# Patient Record
Sex: FEMALE | Race: OTHER | NOT HISPANIC OR LATINO
[De-identification: names, ages, dates, MRNs, and addresses within clinical notes are randomized per-mention and may not be internally consistent; named-entity substitution may affect disease eponyms.]

---

## 2024-03-10 ENCOUNTER — NON-APPOINTMENT (OUTPATIENT)
Age: 74
End: 2024-03-10

## 2024-03-11 ENCOUNTER — EMERGENCY (EMERGENCY)
Facility: HOSPITAL | Age: 74
LOS: 0 days | Discharge: HOME | End: 2024-03-11
Attending: EMERGENCY MEDICINE
Payer: MEDICARE

## 2024-03-11 VITALS
TEMPERATURE: 98 F | RESPIRATION RATE: 18 BRPM | SYSTOLIC BLOOD PRESSURE: 135 MMHG | OXYGEN SATURATION: 99 % | DIASTOLIC BLOOD PRESSURE: 71 MMHG | HEART RATE: 76 BPM

## 2024-03-11 VITALS
TEMPERATURE: 98 F | WEIGHT: 104.94 LBS | HEART RATE: 90 BPM | DIASTOLIC BLOOD PRESSURE: 63 MMHG | SYSTOLIC BLOOD PRESSURE: 117 MMHG | RESPIRATION RATE: 18 BRPM | OXYGEN SATURATION: 98 %

## 2024-03-11 DIAGNOSIS — F41.9 ANXIETY DISORDER, UNSPECIFIED: ICD-10-CM

## 2024-03-11 DIAGNOSIS — R42 DIZZINESS AND GIDDINESS: ICD-10-CM

## 2024-03-11 DIAGNOSIS — R53.1 WEAKNESS: ICD-10-CM

## 2024-03-11 DIAGNOSIS — F32.A DEPRESSION, UNSPECIFIED: ICD-10-CM

## 2024-03-11 LAB
ALBUMIN SERPL ELPH-MCNC: 4.4 G/DL — SIGNIFICANT CHANGE UP (ref 3.5–5.2)
ALP SERPL-CCNC: 72 U/L — SIGNIFICANT CHANGE UP (ref 30–115)
ALT FLD-CCNC: 10 U/L — SIGNIFICANT CHANGE UP (ref 0–41)
ANION GAP SERPL CALC-SCNC: 7 MMOL/L — SIGNIFICANT CHANGE UP (ref 7–14)
AST SERPL-CCNC: 19 U/L — SIGNIFICANT CHANGE UP (ref 0–41)
BASOPHILS # BLD AUTO: 0.02 K/UL — SIGNIFICANT CHANGE UP (ref 0–0.2)
BASOPHILS NFR BLD AUTO: 0.4 % — SIGNIFICANT CHANGE UP (ref 0–1)
BILIRUB SERPL-MCNC: 0.3 MG/DL — SIGNIFICANT CHANGE UP (ref 0.2–1.2)
BUN SERPL-MCNC: 13 MG/DL — SIGNIFICANT CHANGE UP (ref 10–20)
CALCIUM SERPL-MCNC: 9.5 MG/DL — SIGNIFICANT CHANGE UP (ref 8.4–10.5)
CHLORIDE SERPL-SCNC: 106 MMOL/L — SIGNIFICANT CHANGE UP (ref 98–110)
CO2 SERPL-SCNC: 29 MMOL/L — SIGNIFICANT CHANGE UP (ref 17–32)
CREAT SERPL-MCNC: 0.7 MG/DL — SIGNIFICANT CHANGE UP (ref 0.7–1.5)
EGFR: 91 ML/MIN/1.73M2 — SIGNIFICANT CHANGE UP
EOSINOPHIL # BLD AUTO: 0 K/UL — SIGNIFICANT CHANGE UP (ref 0–0.7)
EOSINOPHIL NFR BLD AUTO: 0 % — SIGNIFICANT CHANGE UP (ref 0–8)
GLUCOSE SERPL-MCNC: 93 MG/DL — SIGNIFICANT CHANGE UP (ref 70–99)
HCT VFR BLD CALC: 39.8 % — SIGNIFICANT CHANGE UP (ref 37–47)
HGB BLD-MCNC: 13.3 G/DL — SIGNIFICANT CHANGE UP (ref 12–16)
IMM GRANULOCYTES NFR BLD AUTO: 0.2 % — SIGNIFICANT CHANGE UP (ref 0.1–0.3)
LYMPHOCYTES # BLD AUTO: 0.88 K/UL — LOW (ref 1.2–3.4)
LYMPHOCYTES # BLD AUTO: 16.5 % — LOW (ref 20.5–51.1)
MCHC RBC-ENTMCNC: 29.1 PG — SIGNIFICANT CHANGE UP (ref 27–31)
MCHC RBC-ENTMCNC: 33.4 G/DL — SIGNIFICANT CHANGE UP (ref 32–37)
MCV RBC AUTO: 87.1 FL — SIGNIFICANT CHANGE UP (ref 81–99)
MONOCYTES # BLD AUTO: 0.28 K/UL — SIGNIFICANT CHANGE UP (ref 0.1–0.6)
MONOCYTES NFR BLD AUTO: 5.3 % — SIGNIFICANT CHANGE UP (ref 1.7–9.3)
NEUTROPHILS # BLD AUTO: 4.14 K/UL — SIGNIFICANT CHANGE UP (ref 1.4–6.5)
NEUTROPHILS NFR BLD AUTO: 77.6 % — HIGH (ref 42.2–75.2)
NRBC # BLD: 0 /100 WBCS — SIGNIFICANT CHANGE UP (ref 0–0)
PLATELET # BLD AUTO: 201 K/UL — SIGNIFICANT CHANGE UP (ref 130–400)
PMV BLD: 9.7 FL — SIGNIFICANT CHANGE UP (ref 7.4–10.4)
POTASSIUM SERPL-MCNC: 4.6 MMOL/L — SIGNIFICANT CHANGE UP (ref 3.5–5)
POTASSIUM SERPL-SCNC: 4.6 MMOL/L — SIGNIFICANT CHANGE UP (ref 3.5–5)
PROT SERPL-MCNC: 6.4 G/DL — SIGNIFICANT CHANGE UP (ref 6–8)
RBC # BLD: 4.57 M/UL — SIGNIFICANT CHANGE UP (ref 4.2–5.4)
RBC # FLD: 13.8 % — SIGNIFICANT CHANGE UP (ref 11.5–14.5)
SODIUM SERPL-SCNC: 142 MMOL/L — SIGNIFICANT CHANGE UP (ref 135–146)
WBC # BLD: 5.33 K/UL — SIGNIFICANT CHANGE UP (ref 4.8–10.8)
WBC # FLD AUTO: 5.33 K/UL — SIGNIFICANT CHANGE UP (ref 4.8–10.8)

## 2024-03-11 PROCEDURE — 85025 COMPLETE CBC W/AUTO DIFF WBC: CPT

## 2024-03-11 PROCEDURE — 99284 EMERGENCY DEPT VISIT MOD MDM: CPT | Mod: FS

## 2024-03-11 PROCEDURE — 70450 CT HEAD/BRAIN W/O DYE: CPT | Mod: MC

## 2024-03-11 PROCEDURE — 80053 COMPREHEN METABOLIC PANEL: CPT

## 2024-03-11 PROCEDURE — 70450 CT HEAD/BRAIN W/O DYE: CPT | Mod: 26,MC

## 2024-03-11 PROCEDURE — 36415 COLL VENOUS BLD VENIPUNCTURE: CPT

## 2024-03-11 PROCEDURE — 99284 EMERGENCY DEPT VISIT MOD MDM: CPT | Mod: 25

## 2024-03-11 NOTE — ED PROVIDER NOTE - CARE PROVIDER_API CALL
Randell Devine  Otolaryngology  [address] [place] [id number]  Phone: [phone]  Fax: [phone]  Follow Up Time:

## 2024-03-11 NOTE — ED ADULT NURSE NOTE - OBJECTIVE STATEMENT
pt came in c/o dizziness and weakness for many months. currently does not have a PCP and looking for one.

## 2024-03-11 NOTE — ED PROVIDER NOTE - NSFOLLOWUPINSTRUCTIONS_ED_ALL_ED_FT
Follow up with PMD and ENT in 1-2 days.    Dizziness  Dizziness is a common problem. It is a feeling of unsteadiness or light-headedness. You may feel like you are about to faint. Dizziness can lead to injury if you stumble or fall. Anyone can become dizzy, but dizziness is more common in older adults. This condition can be caused by a number of things, including medicines, dehydration, or illness.    Follow these instructions at home:  Eating and drinking     Drink enough fluid to keep your urine clear or pale yellow. This helps to keep you from becoming dehydrated. Try to drink more clear fluids, such as water.  Do not drink alcohol.  Limit your caffeine intake if told to do so by your health care provider. Check ingredients and nutrition facts to see if a food or beverage contains caffeine.  Limit your salt (sodium) intake if told to do so by your health care provider. Check ingredients and nutrition facts to see if a food or beverage contains sodium.  Activity     Avoid making quick movements.  Rise slowly from chairs and steady yourself until you feel okay.  In the morning, first sit up on the side of the bed. When you feel okay, stand slowly while you hold onto something until you know that your balance is fine.  If you need to  one place for a long time, move your legs often. Tighten and relax the muscles in your legs while you are standing.  Do not drive or use heavy machinery if you feel dizzy.  Avoid bending down if you feel dizzy. Place items in your home so that they are easy for you to reach without leaning over.  Lifestyle     Do not use any products that contain nicotine or tobacco, such as cigarettes and e-cigarettes. If you need help quitting, ask your health care provider.  Try to reduce your stress level by using methods such as yoga or meditation. Talk with your health care provider if you need help to manage your stress.  General instructions     Watch your dizziness for any changes.  Take over-the-counter and prescription medicines only as told by your health care provider. Talk with your health care provider if you think that your dizziness is caused by a medicine that you are taking.  Tell a friend or a family member that you are feeling dizzy. If he or she notices any changes in your behavior, have this person call your health care provider.  Keep all follow-up visits as told by your health care provider. This is important.  Contact a health care provider if:  Your dizziness does not go away.  Your dizziness or light-headedness gets worse.  You feel nauseous.  You have reduced hearing.  You have new symptoms.  You are unsteady on your feet or you feel like the room is spinning.  Get help right away if:  You vomit or have diarrhea and are unable to eat or drink anything.  You have problems talking, walking, swallowing, or using your arms, hands, or legs.  You feel generally weak.  You are not thinking clearly or you have trouble forming sentences. It may take a friend or family member to notice this.  You have chest pain, abdominal pain, shortness of breath, or sweating.  Your vision changes.  You have any bleeding.  You have a severe headache.  You have neck pain or a stiff neck.  You have a fever.  These symptoms may represent a serious problem that is an emergency. Do not wait to see if the symptoms will go away. Get medical help right away. Call your local emergency services (911 in the [place] Do not drive yourself to the hospital.     Summary  Dizziness is a feeling of unsteadiness or light-headedness. This condition can be caused by a number of things, including medicines, dehydration, or illness.  Anyone can become dizzy, but dizziness is more common in older adults.  Drink enough fluid to keep your urine clear or pale yellow. Do not drink alcohol.  Avoid making quick movements if you feel dizzy. Monitor your dizziness for any changes.  This information is not intended to replace advice given to you by your health care provider. Make sure you discuss any questions you have with your health care provider.

## 2024-03-11 NOTE — ED PROVIDER NOTE - CLINICAL SUMMARY MEDICAL DECISION MAKING FREE TEXT BOX
This patient presents with dizziness, most consistent with a peripheral cause. No history of recent infection so doubt vestibular neuritis. History not consistent with meniere's disease. No history of trauma. No red flag features for central vertigo to include gradual onset, vertical/bidirectional or non-fatigable nystagmus, focal neurologic findings on exam (including inability to ambulate, ataxia, dysmetria). Presentation not consistent with an acute CNS infection, vertebral basilar artery insufficiency, cerebellar hemorrhage or infarction, intracranial mass or bleed. Character low suspicion for CVA and no focal or localizing findings. No significant arrhythmia or evidence of aortic outflow obstruction. No anemia or bleeding or gross electrolyte abnormalities. No CP/SOB to suggest ACS or evidence of DVT to suggest PE. No fever or specific infectious symptoms.    No indication for admission or further emergent evaluation at this time. Was explained that the source of the patient's symptoms is UNCLEAR, and that the patient should still follow up with their primary physician or neurology  for further evaluation and management. These elements were discussed with the PATIENT and daughter and they are amenable to outpatient follow-up at this time.    They were given detailed return precautions and advised to return to the emergency department in 2-3 days if not improving or sooner if any new symptoms develop, symptoms worsened or for any concerns. They were offered the opportunity to ask questions and verbalized that they understand the diagnosis and discharge instructions.

## 2024-03-11 NOTE — ED PROVIDER NOTE - PATIENT PORTAL LINK FT
You can access the FollowMyHealth Patient Portal offered by [facility] by registering at the following website: [email or web address]. By joining [employer]’s FollowMyHealth portal, you will also be able to view your health information using other applications (apps) compatible with our system.

## 2024-03-11 NOTE — ED PROVIDER NOTE - ATTENDING APP SHARED VISIT CONTRIBUTION OF CARE
I have reviewed and agree with the mid-level note, except as documented in my note below.    73-year-old female history of depression, anxiety, denies significant PSH, non-smoker, denies daily EtOH use, presents with dizziness and generalized weakness over the past several months, persistent, denies modifying factors, also reports bilateral lower extremity weakness (denies numbness currently even though noted at triage), denies fever, visual disturbances, headache, palpitations, irregular heart-beat, chest pain / pressure, abd pain, n/v/d, urinary sx, dyspnea, LE edema, near or total loss of consciousness, abnormal speech, paresthesias, clumsiness, difficulty with coordination, focal weakness or neurological deficits, postural changes, gait disturbances, new medication changes, change in caffeine, nicotine or alcohol intake, recent trauma or other associated complaints at present. No old chart available for review. I have reviewed and agree with the initial nursing note, except as documented in my note.     VSS, awake, alert, non-toxic appearing, PERRL / EOMI,  no nystagmus, external ears are normal, auditory meatus is clear and non-erythematous bilaterally, BVL cerumen impaction, oropharynx clear, mmm, no JVD or carotid bruit, no skin rash or lesions, chest CTAB, non-labored breathing, no w/r/r, +S1/S2, RRR, no m/r/g, abdomen soft, NT, ND, +BS, no peripheral edema or deformities, equal pulses upper and lower extremities, alert and oriented to person, place and time, clear speech, cranial nerves II-XII are intact, upper and lower extremity strength is 5/5, symmetrical against gravity and external force, cerebellar testing of finger to nose is intact, coordination and gait are normal.

## 2024-03-11 NOTE — ED PROVIDER NOTE - PHYSICAL EXAMINATION
CONST: Well appearing in NAD  EYES: PERRL, EOMI, Sclera and conjunctiva clear.   ENT: No nasal discharge. Oropharynx normal appearing.   NECK: Non-tender, no meningeal signs. normal ROM. supple   CARD: S1 S2; No jvd  RESP: Equal BS B/L, No wheezes, rhonchi or rales. No distress  GI: Soft, non-tender, non-distended. normal BS  MS: Normal ROM in all extremities. pulses 2 +. no calf tenderness or swelling  SKIN: Warm, dry, no acute rashes. Good turgor  NEURO: A&Ox3, No focal deficits. Strength 5/5 with no sensory deficits. Finger to nose intact. Negative pronator drift CONST: Well appearing in NAD  EYES: PERRL, EOMI, Sclera and conjunctiva clear.   ENT: B/L cerumen impaction. No nasal discharge. Oropharynx normal appearing.   NECK: Non-tender, no meningeal signs. normal ROM. supple   CARD: S1 S2; No jvd  RESP: Equal BS B/L, No wheezes, rhonchi or rales. No distress  GI: Soft, non-tender, non-distended. normal BS  MS: Normal ROM in all extremities. pulses 2 +. no calf tenderness or swelling  SKIN: Warm, dry, no acute rashes. Good turgor  NEURO: A&Ox3, No focal deficits. Strength 5/5 with no sensory deficits. Finger to nose intact. Negative pronator drift

## 2024-03-11 NOTE — ED PROVIDER NOTE - OBJECTIVE STATEMENT
73-year-old female past medical history of anxiety, depression presents for evaluation.  Patient endorses generalized weakness for the past couple months with associated lightheadedness, aggravated while at dance, no relieving factors.  Denies fever, headache, chest pain, shortness of breath, weakness, dizziness, hematuria.

## 2024-04-09 PROBLEM — Z00.00 ENCOUNTER FOR PREVENTIVE HEALTH EXAMINATION: Status: ACTIVE | Noted: 2024-04-09

## 2024-04-15 ENCOUNTER — APPOINTMENT (OUTPATIENT)
Dept: NEUROLOGY | Facility: CLINIC | Age: 74
End: 2024-04-15

## 2024-04-18 ENCOUNTER — APPOINTMENT (OUTPATIENT)
Dept: NEUROLOGY | Facility: CLINIC | Age: 74
End: 2024-04-18
Payer: MEDICARE

## 2024-04-18 VITALS
WEIGHT: 98 LBS | HEART RATE: 77 BPM | DIASTOLIC BLOOD PRESSURE: 82 MMHG | SYSTOLIC BLOOD PRESSURE: 141 MMHG | BODY MASS INDEX: 15.75 KG/M2 | HEIGHT: 66 IN

## 2024-04-18 PROCEDURE — G2211 COMPLEX E/M VISIT ADD ON: CPT

## 2024-04-18 PROCEDURE — 99204 OFFICE O/P NEW MOD 45 MIN: CPT

## 2024-04-18 NOTE — ASSESSMENT
[FreeTextEntry1] : Unsteady gait-etiology unclear, may involve both central or peripheral causes.  Even though she had a CT of the head already, however CT does not image the posterior fossa well, therefore she needs an MRI.  She also has symptoms and signs of neuropathy that need to be evaluated.    - MRI of the brain without gadolinium - EMG/NCS of the lower extremities - Trial of physical therapy for fall prevention and gait training  Cervicalgia - Trial of physical therapy  Total clinician time spent  is  46 minutes including preparing to see the patient, obtaining and/or reviewing and confirming history, performing a medically necessary and appropriate examination, counseling and educating the patient and/or family, documenting clinical information in the HER and communicating and/or referring to other healthcare professionals.

## 2024-04-18 NOTE — PHYSICAL EXAM
[Person] : oriented to person [Place] : oriented to place [Time] : oriented to time [Concentration Intact] : normal concentrating ability [Visual Intact] : visual attention was ~T not ~L decreased [Naming Objects] : no difficulty naming common objects [Repeating Phrases] : no difficulty repeating a phrase [Writing A Sentence] : no difficulty writing a sentence [Fluency] : fluency intact [Comprehension] : comprehension intact [Reading] : reading intact [Past History] : adequate knowledge of personal past history [Cranial Nerves Optic (II)] : visual acuity intact bilaterally,  visual fields full to confrontation, pupils equal round and reactive to light [Cranial Nerves Oculomotor (III)] : extraocular motion intact [Cranial Nerves Trigeminal (V)] : facial sensation intact symmetrically [Cranial Nerves Facial (VII)] : face symmetrical [Cranial Nerves Vestibulocochlear (VIII)] : hearing was intact bilaterally [Cranial Nerves Glossopharyngeal (IX)] : tongue and palate midline [Cranial Nerves Accessory (XI - Cranial And Spinal)] : head turning and shoulder shrug symmetric [Cranial Nerves Hypoglossal (XII)] : there was no tongue deviation with protrusion [Motor Tone] : muscle tone was normal in all four extremities [Motor Strength] : muscle strength was normal in all four extremities [No Muscle Atrophy] : normal bulk in all four extremities [Past-pointing] : there was no past-pointing [Tremor] : no tremor present [1+] : Ankle jerk left 1+ [Plantar Reflex Right Only] : normal on the right [Plantar Reflex Left Only] : normal on the left [FreeTextEntry6] : Very limited range of motion in the neck, spasm of bilateral trapezii as well as cervical paraspinals [FreeTextEntry7] : Decreased vibration distally [FreeTextEntry8] : Positive Romberg, slight trouble with tandem

## 2024-04-19 ENCOUNTER — TRANSCRIPTION ENCOUNTER (OUTPATIENT)
Age: 74
End: 2024-04-19

## 2024-05-03 ENCOUNTER — APPOINTMENT (OUTPATIENT)
Dept: NEUROLOGY | Facility: CLINIC | Age: 74
End: 2024-05-03
Payer: MEDICARE

## 2024-05-03 PROCEDURE — 95912 NRV CNDJ TEST 11-12 STUDIES: CPT

## 2024-05-03 PROCEDURE — 95886 MUSC TEST DONE W/N TEST COMP: CPT

## 2024-05-17 ENCOUNTER — APPOINTMENT (OUTPATIENT)
Dept: NEUROLOGY | Facility: CLINIC | Age: 74
End: 2024-05-17
Payer: MEDICARE

## 2024-05-17 VITALS — HEART RATE: 89 BPM | SYSTOLIC BLOOD PRESSURE: 111 MMHG | DIASTOLIC BLOOD PRESSURE: 74 MMHG

## 2024-05-17 VITALS — BODY MASS INDEX: 15.75 KG/M2 | WEIGHT: 98 LBS | HEIGHT: 66 IN

## 2024-05-17 DIAGNOSIS — M54.2 CERVICALGIA: ICD-10-CM

## 2024-05-17 DIAGNOSIS — Z86.69 PERSONAL HISTORY OF OTHER DISEASES OF THE NERVOUS SYSTEM AND SENSE ORGANS: ICD-10-CM

## 2024-05-17 DIAGNOSIS — R26.81 UNSTEADINESS ON FEET: ICD-10-CM

## 2024-05-17 PROCEDURE — 99213 OFFICE O/P EST LOW 20 MIN: CPT

## 2024-05-17 NOTE — HISTORY OF PRESENT ILLNESS
[FreeTextEntry1] : Ms. NELSON EUBANKS returns to the office for follow-up and her prior history and physical have been reviewed and she reports no change since last visit.  She was last seen for the evaluation of unsteady gait.  She was sent for MRI of the brain as well as EMG/NCS of the lower extremities to rule out both central as well as peripheral causes.  MRI of the brain did not show any significant intracranial pathology, and there was no evidence of peripheral neuropathy on the EMG/NCS of the lower extremities.  Patient improved clinically after stopping taking Abilify as well as starting physical therapy.  She is still on few other psych medication that she may want to get off as well.    She also has neck pain but has not done therapy for that yet

## 2024-05-17 NOTE — ASSESSMENT
[FreeTextEntry1] : Unsteady gait-much improved after stopping Abilify.  Possibly medication side effect. -Went over MRI of the brain as well as EMG/NCS of the lower extremities results.  Patient was relieved that her MRI of the brain did not have any significant pathology - Continue physical therapy if needed  Cervicalgia - A trial of physical therapy - Can return on as-needed basis

## 2025-03-08 ENCOUNTER — INPATIENT (INPATIENT)
Facility: HOSPITAL | Age: 75
LOS: 2 days | Discharge: HOME HEALTH | DRG: 964 | End: 2025-03-11
Attending: SURGERY | Admitting: SURGERY
Payer: MEDICARE

## 2025-03-08 VITALS
DIASTOLIC BLOOD PRESSURE: 74 MMHG | RESPIRATION RATE: 18 BRPM | WEIGHT: 149.91 LBS | SYSTOLIC BLOOD PRESSURE: 115 MMHG | TEMPERATURE: 98 F | OXYGEN SATURATION: 95 % | HEART RATE: 74 BPM
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
## 2025-03-08 LAB
ALBUMIN SERPL ELPH-MCNC: 4.8 G/DL — SIGNIFICANT CHANGE UP (ref 3.5–5.2)
ALP SERPL-CCNC: 98 U/L — SIGNIFICANT CHANGE UP (ref 30–115)
ALT FLD-CCNC: 35 U/L — SIGNIFICANT CHANGE UP (ref 0–41)
ANION GAP SERPL CALC-SCNC: 15 MMOL/L — HIGH (ref 7–14)
APTT BLD: 31.7 SEC — SIGNIFICANT CHANGE UP (ref 27–39.2)
AST SERPL-CCNC: 48 U/L — HIGH (ref 0–41)
BASOPHILS # BLD AUTO: 0.09 K/UL — SIGNIFICANT CHANGE UP (ref 0–0.2)
BASOPHILS NFR BLD AUTO: 0.8 % — SIGNIFICANT CHANGE UP (ref 0–1)
BILIRUB SERPL-MCNC: 0.6 MG/DL — SIGNIFICANT CHANGE UP (ref 0.2–1.2)
BUN SERPL-MCNC: 10 MG/DL — SIGNIFICANT CHANGE UP (ref 10–20)
CALCIUM SERPL-MCNC: 9.1 MG/DL — SIGNIFICANT CHANGE UP (ref 8.4–10.5)
CHLORIDE SERPL-SCNC: 96 MMOL/L — LOW (ref 98–110)
CK SERPL-CCNC: 270 U/L — HIGH (ref 0–225)
CO2 SERPL-SCNC: 22 MMOL/L — SIGNIFICANT CHANGE UP (ref 17–32)
CREAT SERPL-MCNC: 0.6 MG/DL — LOW (ref 0.7–1.5)
EGFR: 94 ML/MIN/1.73M2 — SIGNIFICANT CHANGE UP
EGFR: 94 ML/MIN/1.73M2 — SIGNIFICANT CHANGE UP
EOSINOPHIL # BLD AUTO: 0 K/UL — SIGNIFICANT CHANGE UP (ref 0–0.7)
EOSINOPHIL NFR BLD AUTO: 0 % — SIGNIFICANT CHANGE UP (ref 0–8)
GLUCOSE SERPL-MCNC: 65 MG/DL — LOW (ref 70–99)
HCT VFR BLD CALC: 43.5 % — SIGNIFICANT CHANGE UP (ref 37–47)
HGB BLD-MCNC: 14.4 G/DL — SIGNIFICANT CHANGE UP (ref 12–16)
INR BLD: 0.84 RATIO — SIGNIFICANT CHANGE UP (ref 0.65–1.3)
LACTATE SERPL-SCNC: 3.1 MMOL/L — HIGH (ref 0.7–2)
LIDOCAIN IGE QN: 48 U/L — SIGNIFICANT CHANGE UP (ref 7–60)
LYMPHOCYTES # BLD AUTO: 0.11 K/UL — LOW (ref 1.2–3.4)
LYMPHOCYTES # BLD AUTO: 0.9 % — LOW (ref 20.5–51.1)
MCHC RBC-ENTMCNC: 29.6 PG — SIGNIFICANT CHANGE UP (ref 27–31)
MCHC RBC-ENTMCNC: 33.1 G/DL — SIGNIFICANT CHANGE UP (ref 32–37)
MCV RBC AUTO: 89.3 FL — SIGNIFICANT CHANGE UP (ref 81–99)
MONOCYTES # BLD AUTO: 0.11 K/UL — SIGNIFICANT CHANGE UP (ref 0.1–0.6)
MONOCYTES NFR BLD AUTO: 0.9 % — LOW (ref 1.7–9.3)
NEUTROPHILS # BLD AUTO: 11.09 K/UL — HIGH (ref 1.4–6.5)
NEUTROPHILS NFR BLD AUTO: 94.8 % — HIGH (ref 42.2–75.2)
PLATELET # BLD AUTO: 218 K/UL — SIGNIFICANT CHANGE UP (ref 130–400)
PMV BLD: 8.6 FL — SIGNIFICANT CHANGE UP (ref 7.4–10.4)
POTASSIUM SERPL-MCNC: 5.1 MMOL/L — HIGH (ref 3.5–5)
POTASSIUM SERPL-SCNC: 5.1 MMOL/L — HIGH (ref 3.5–5)
PROT SERPL-MCNC: 6.7 G/DL — SIGNIFICANT CHANGE UP (ref 6–8)
PROTHROM AB SERPL-ACNC: 9.9 SEC — LOW (ref 9.95–12.87)
RBC # BLD: 4.87 M/UL — SIGNIFICANT CHANGE UP (ref 4.2–5.4)
RBC # FLD: 14.6 % — HIGH (ref 11.5–14.5)
SODIUM SERPL-SCNC: 133 MMOL/L — LOW (ref 135–146)
TROPONIN T, HIGH SENSITIVITY RESULT: 10 NG/L — SIGNIFICANT CHANGE UP (ref 6–13)
WBC # BLD: 11.7 K/UL — HIGH (ref 4.8–10.8)
WBC # FLD AUTO: 11.7 K/UL — HIGH (ref 4.8–10.8)

## 2025-03-08 PROCEDURE — 81003 URINALYSIS AUTO W/O SCOPE: CPT

## 2025-03-08 PROCEDURE — 97161 PT EVAL LOW COMPLEX 20 MIN: CPT | Mod: GP

## 2025-03-08 PROCEDURE — 70450 CT HEAD/BRAIN W/O DYE: CPT | Mod: 26,76

## 2025-03-08 PROCEDURE — 83735 ASSAY OF MAGNESIUM: CPT

## 2025-03-08 PROCEDURE — 84100 ASSAY OF PHOSPHORUS: CPT

## 2025-03-08 PROCEDURE — 87641 MR-STAPH DNA AMP PROBE: CPT

## 2025-03-08 PROCEDURE — 93306 TTE W/DOPPLER COMPLETE: CPT

## 2025-03-08 PROCEDURE — 99284 EMERGENCY DEPT VISIT MOD MDM: CPT | Mod: GC

## 2025-03-08 PROCEDURE — 85027 COMPLETE CBC AUTOMATED: CPT

## 2025-03-08 PROCEDURE — 86901 BLOOD TYPING SEROLOGIC RH(D): CPT

## 2025-03-08 PROCEDURE — 73552 X-RAY EXAM OF FEMUR 2/>: CPT | Mod: 26,RT

## 2025-03-08 PROCEDURE — 93010 ELECTROCARDIOGRAM REPORT: CPT

## 2025-03-08 PROCEDURE — 80048 BASIC METABOLIC PNL TOTAL CA: CPT

## 2025-03-08 PROCEDURE — 74177 CT ABD & PELVIS W/CONTRAST: CPT | Mod: 26

## 2025-03-08 PROCEDURE — 72170 X-RAY EXAM OF PELVIS: CPT | Mod: 26

## 2025-03-08 PROCEDURE — 87640 STAPH A DNA AMP PROBE: CPT

## 2025-03-08 PROCEDURE — 99291 CRITICAL CARE FIRST HOUR: CPT

## 2025-03-08 PROCEDURE — 71045 X-RAY EXAM CHEST 1 VIEW: CPT | Mod: 26

## 2025-03-08 PROCEDURE — 86900 BLOOD TYPING SEROLOGIC ABO: CPT

## 2025-03-08 PROCEDURE — 72125 CT NECK SPINE W/O DYE: CPT | Mod: 26

## 2025-03-08 PROCEDURE — 85025 COMPLETE CBC W/AUTO DIFF WBC: CPT

## 2025-03-08 PROCEDURE — 82550 ASSAY OF CK (CPK): CPT

## 2025-03-08 PROCEDURE — 70450 CT HEAD/BRAIN W/O DYE: CPT | Mod: MC

## 2025-03-08 PROCEDURE — 36415 COLL VENOUS BLD VENIPUNCTURE: CPT

## 2025-03-08 PROCEDURE — 94010 BREATHING CAPACITY TEST: CPT

## 2025-03-08 PROCEDURE — 71260 CT THORAX DX C+: CPT | Mod: 26

## 2025-03-08 PROCEDURE — 86850 RBC ANTIBODY SCREEN: CPT

## 2025-03-08 PROCEDURE — 80076 HEPATIC FUNCTION PANEL: CPT

## 2025-03-08 PROCEDURE — 82962 GLUCOSE BLOOD TEST: CPT

## 2025-03-08 PROCEDURE — 83605 ASSAY OF LACTIC ACID: CPT

## 2025-03-08 RX ORDER — LEVETIRACETAM 10 MG/ML
2000 INJECTION, SOLUTION INTRAVENOUS ONCE
Refills: 0 | Status: COMPLETED | OUTPATIENT
Start: 2025-03-08 | End: 2025-03-08

## 2025-03-08 RX ORDER — SODIUM CHLORIDE 9 G/1000ML
1000 INJECTION, SOLUTION INTRAVENOUS
Refills: 0 | Status: DISCONTINUED | OUTPATIENT
Start: 2025-03-08 | End: 2025-03-08

## 2025-03-08 RX ORDER — QUETIAPINE FUMARATE 25 MG/1
1 TABLET ORAL
Refills: 0 | DISCHARGE

## 2025-03-08 RX ORDER — SENNA 187 MG
2 TABLET ORAL AT BEDTIME
Refills: 0 | Status: DISCONTINUED | OUTPATIENT
Start: 2025-03-08 | End: 2025-03-11

## 2025-03-08 RX ORDER — SODIUM CHLORIDE 9 G/1000ML
1000 INJECTION, SOLUTION INTRAVENOUS ONCE
Refills: 0 | Status: COMPLETED | OUTPATIENT
Start: 2025-03-08 | End: 2025-03-08

## 2025-03-08 RX ORDER — ESCITALOPRAM OXALATE 20 MG/1
1 TABLET ORAL
Refills: 0 | DISCHARGE

## 2025-03-08 RX ORDER — ACETAMINOPHEN 500 MG/5ML
650 LIQUID (ML) ORAL EVERY 6 HOURS
Refills: 0 | Status: DISCONTINUED | OUTPATIENT
Start: 2025-03-08 | End: 2025-03-11

## 2025-03-08 RX ORDER — ALPRAZOLAM 0.5 MG
0.5 TABLET, EXTENDED RELEASE 24 HR ORAL DAILY
Refills: 0 | Status: DISCONTINUED | OUTPATIENT
Start: 2025-03-08 | End: 2025-03-09

## 2025-03-08 RX ORDER — LIDOCAINE HYDROCHLORIDE 20 MG/ML
1 JELLY TOPICAL DAILY
Refills: 0 | Status: DISCONTINUED | OUTPATIENT
Start: 2025-03-08 | End: 2025-03-11

## 2025-03-08 RX ORDER — ESCITALOPRAM OXALATE 20 MG/1
20 TABLET ORAL DAILY
Refills: 0 | Status: DISCONTINUED | OUTPATIENT
Start: 2025-03-08 | End: 2025-03-11

## 2025-03-08 RX ORDER — MIRTAZAPINE 30 MG/1
7.5 TABLET, FILM COATED ORAL DAILY
Refills: 0 | Status: DISCONTINUED | OUTPATIENT
Start: 2025-03-08 | End: 2025-03-10

## 2025-03-08 RX ORDER — ALPRAZOLAM 0.5 MG
1 TABLET, EXTENDED RELEASE 24 HR ORAL
Refills: 0 | DISCHARGE

## 2025-03-08 RX ORDER — QUETIAPINE FUMARATE 25 MG/1
100 TABLET ORAL AT BEDTIME
Refills: 0 | Status: DISCONTINUED | OUTPATIENT
Start: 2025-03-08 | End: 2025-03-11

## 2025-03-08 RX ORDER — LEVETIRACETAM 10 MG/ML
500 INJECTION, SOLUTION INTRAVENOUS EVERY 12 HOURS
Refills: 0 | Status: DISCONTINUED | OUTPATIENT
Start: 2025-03-08 | End: 2025-03-11

## 2025-03-08 RX ORDER — MIRTAZAPINE 30 MG/1
2 TABLET, FILM COATED ORAL
Refills: 0 | DISCHARGE

## 2025-03-08 RX ADMIN — MIRTAZAPINE 7.5 MILLIGRAM(S): 30 TABLET, FILM COATED ORAL at 22:16

## 2025-03-08 RX ADMIN — LEVETIRACETAM 1000 MILLIGRAM(S): 10 INJECTION, SOLUTION INTRAVENOUS at 18:57

## 2025-03-08 RX ADMIN — Medication 0.5 MILLIGRAM(S): at 22:17

## 2025-03-08 RX ADMIN — SODIUM CHLORIDE 1000 MILLILITER(S): 9 INJECTION, SOLUTION INTRAVENOUS at 17:21

## 2025-03-08 RX ADMIN — QUETIAPINE FUMARATE 100 MILLIGRAM(S): 25 TABLET ORAL at 22:17

## 2025-03-08 RX ADMIN — Medication 100 MILLILITER(S): at 20:15

## 2025-03-08 NOTE — ED ADULT NURSE NOTE - NSFALLHARMRISKINTERV_ED_ALL_ED
Communicate risk of Fall with Harm to all staff, patient, and family/Provide visual cue: red socks, yellow wristband, yellow gown, etc/Reinforce activity limits and safety measures with patient and family/Use of alarms - bed, stretcher, chair and/or video monitoring/Bed in lowest position, wheels locked, appropriate side rails in place/Call bell, personal items and telephone in reach/Instruct patient to call for assistance before getting out of bed/chair/stretcher/Non-slip footwear applied when patient is off stretcher/[place] to call system/Physically safe environment - no spills, clutter or unnecessary equipment/Purposeful Proactive Rounding/Room/bathroom lighting operational, light cord in reach

## 2025-03-08 NOTE — CONSULT NOTE ADULT - ASSESSMENT
74y Female  w/       NEURO/PSYCH:  #Sedation  - RASS goal: 0 to -1        #Acute pain  -     #PMHx of     acetaminophen     Tablet .. 650 milliGRAM(s) Oral every 6 hours PRN Mild Pain (1 - 3)  ALPRAZolam 0.5 milliGRAM(s) Oral daily  escitalopram 20 milliGRAM(s) Oral daily  mirtazapine 7.5 milliGRAM(s) Oral daily  QUEtiapine 100 milliGRAM(s) Oral at bedtime      Home meds: Lexapro 20 mg oral tablet  mirtazapine 7.5 mg oral tablet  Seroquel 100 mg oral tablet  Xanax 0.5 mg oral tablet      RESP:   #Oxygenation  - Intubated on **/**. ETT: ** f, lip line **.      - wean as tolerated  - encourage incentive spirometry  #Activity  - increase as tolerated    #PMHx of         Home Meds:     CARDIAC:   - SBP **    #PMHx of    Imaging:   - EKG: **  - Echo: **    Labs: Troponins: 03-08-25 @ 15:27 -- 10          Home meds:     GI/NUTR:   #      - NG tube at ** length, connected to **  - aspiration precautions, HOB 30    #GI Prophylaxis  - Indication: **  -     #Bowel regimen  - n/a  -   -     #PMHx of        Home meds:     /RENAL:   #urine output in critically ill  - indwelling salcedo (placed **)  #IVF  -     Labs:   Labs:          BUN/Cr -  10/0.6  -->          [03-08 @ 15:27]Na  133 // K  5.1 // Mg  -- // Phos  --      lactated ringers. 1000 milliLiter(s) IV Continuous <Continuous>      Home meds:       HEME/ONC:   #DVT prophylaxis  - SCDs  - **holding chemical PPx in the setting of **  - if DVT prophylaxis to be held, document and place VTE order         #PMHx of    Labs:   Labs: Hgb/Hct:  14.4/43.5  (03-08 @ 15:27)  -->                      Platelets:  218  -->                 PTT/INR:  31.7/0.84  --->             Home:      T&S Expires: **        ID:  #    #MRSA prevention  - MRSA swab pending    WBC- 11.70  --->>  Temp trend- 24hrs T(F): 98.2 (03-08 @ 16:08), Max: 98.2 (03-08 @ 16:08)  Current antibiotics-      #PMHx of    ENDO:  - Glucose goal 140-180. if above 180, start insulin sliding scale  - FS q6h while NPO    MSK:  #acute right comminuted femoral greater trochanteric fracture  - ortho consulted, pending recs    DERM:  - DTI screen pending    PALLIATIVE/GOALS OF CARE:  - Palliative care not required for pt at this time  - Code Status: full code    LINES/DRAINS:  PIV, Salcedo , TLC (**), Arterial Line (**), ETT (**), NG / OG (**)    Discontinued lines/drains:     HCP/Emergency Contact -    INDICATION FOR SICU / SDU:  q1h neuro checks    DISPO:   SICU     Case discussed with attending Dr. Gray 74y Female s/p syncopal fall, admitted to SICU for q1h neuro checks and syncopal workup      NEURO/PSYCH:  #acute on chronic SDH, acute SAH/IPH  - Neurosurgery - not recommending surgical intervention, Keppra, rpt CTH in 6hrs, and neuroendovascular consult for MMA embolization.   - neuroendovascular consult pending  - q1h neuro checks, can transition to q4h checks if repeat CTH is stable  - keppra 500mg PO BID    #Acute pain  - acetaminophen 650 milliGRAM(s) Oral every 6 hours PRN Mild Pain (1 - 3)  - lidocaine patch    #PMHx of anxiety/depression  - ALPRAZolam 0.5 milliGRAM(s) Oral daily  - escitalopram 20 milliGRAM(s) Oral daily  - mirtazapine 7.5 milliGRAM(s) Oral daily  - QUEtiapine 100 milliGRAM(s) Oral at bedtime    RESP:   #Oxygenation  - room air  - encourage incentive spirometry  #Activity  - increase as tolerated    CARDIAC:   #syncopal fall  - no cardiac PMHx  - TTE pending  - EKG read pending    GI/NUTR:   #Diet, NPO except meds  - keep NPO until repeat scan  - aspiration precautions, HOB 30    #GI Prophylaxis  - n/a    #Bowel regimen  - n/a    /RENAL:   #elevated lactate  - Lactate 3.1 on admission, rpt pending  #urine output in critically ill  - voiding freely    #IVF  - NS at 100mL/hr    Labs:          BUN/Cr -  10/0.6  -->          [03-08 @ 15:27]Na  133 // K  5.1 // Mg  -- // Phos  --    - CK on admission - 270      HEME/ONC:   #DVT prophylaxis  - SCDs  - holding chemical PPx in the setting of intracranial bleed    Labs:   Labs: Hgb/Hct:  14.4/43.5  (03-08 @ 15:27)  -->                      Platelets:  218  -->                 PTT/INR:  31.7/0.84  --->       T&S Expires: pending    ID:  #monitor for leukocytosis/fever  - UA pending  #MRSA prevention  - MRSA swab pending    WBC- 11.70  --->>  Temp trend- 24hrs T(F): 98.2 (03-08 @ 16:08), Max: 98.2 (03-08 @ 16:08)    ENDO:  - Glucose goal 140-180. if above 180, start insulin sliding scale  - FS q6h while NPO    MSK:  #acute right comminuted femoral greater trochanteric fracture  - ortho consulted, pending recs    DERM:  - DTI screen pending    PALLIATIVE/GOALS OF CARE:  - Palliative care not required for pt at this time  - Code Status: full code    LINES/DRAINS:  PIV, Dickson , TLC (**), Arterial Line (**), ETT (**), NG / OG (**)    Discontinued lines/drains:     HCP/Emergency Contact -     INDICATION FOR SICU:  q1h neuro checks    DISPO:  SICU     Case discussed with attending Dr. Gray 74y Female s/p syncopal fall, admitted to SICU for q1h neuro checks and syncopal workup      NEURO/PSYCH:  #acute on chronic SDH, acute SAH/IPH  - Neurosurgery - not recommending surgical intervention, Keppra, rpt CTH in 6hrs, and neuroendovascular consult for MMA embolization.   - neuroendovascular consult pending  - q1h neuro checks, can transition to q4h checks if repeat CTH is stable  - keppra 500mg PO BID    #Acute pain  - acetaminophen 650 milliGRAM(s) Oral every 6 hours PRN Mild Pain (1 - 3)  - lidocaine patch    #PMHx of anxiety/depression  - ALPRAZolam 0.5 milliGRAM(s) Oral daily  - escitalopram 20 milliGRAM(s) Oral daily  - mirtazapine 7.5 milliGRAM(s) Oral daily  - QUEtiapine 100 milliGRAM(s) Oral at bedtime    RESP:   #Oxygenation  - room air  - encourage incentive spirometry  #Activity  - increase as tolerated    CARDIAC:   #syncopal fall  - no cardiac PMHx  - TTE pending  - EKG read pending    GI/NUTR:   #Diet, NPO except meds  - keep NPO until repeat scan  - aspiration precautions, HOB 30    #GI Prophylaxis  - n/a    #Bowel regimen  - n/a    /RENAL:   #elevated lactate  - Lactate 3.1 on admission, rpt pending  #urine output in critically ill  - voiding freely    #IVF  - NS at 100mL/hr    Labs:          BUN/Cr -  10/0.6  -->          [03-08 @ 15:27]Na  133 // K  5.1 // Mg  -- // Phos  --    - CK on admission - 270      HEME/ONC:   #DVT prophylaxis  - SCDs  - holding chemical PPx in the setting of intracranial bleed    Labs:   Labs: Hgb/Hct:  14.4/43.5  (03-08 @ 15:27)  -->                      Platelets:  218  -->                 PTT/INR:  31.7/0.84  --->       T&S Expires: pending    ID:  #monitor for leukocytosis/fever  - UA pending  #MRSA prevention  - MRSA swab pending    WBC- 11.70  --->>  Temp trend- 24hrs T(F): 98.2 (03-08 @ 16:08), Max: 98.2 (03-08 @ 16:08)    ENDO:  - Glucose goal 140-180. if above 180, start insulin sliding scale  - FS q6h while NPO    MSK:  #acute right comminuted femoral greater trochanteric fracture  - ortho consulted, pending recs    DERM:  - DTI screen pending    PALLIATIVE/GOALS OF CARE:  - Palliative care not required for pt at this time  - Code Status: full code    LINES/DRAINS:  PIV,     Discontinued lines/drains:     HCP/Emergency Contact -     INDICATION FOR SICU:  q1h neuro checks    DISPO:  SICU     Case discussed with attending Dr. Gray

## 2025-03-08 NOTE — ED PROVIDER NOTE - DIFFERENTIAL DIAGNOSIS
Differential Diagnosis dehydration, brain injury, EKG abnormalities, rhabdomyolysis, renal insufficiency, closed head injury.

## 2025-03-08 NOTE — CONSULT NOTE ADULT - SUBJECTIVE AND OBJECTIVE BOX
HPI:    74yF w/ PMHx of anxiety and depression seen as a trauma consult s/p unwitnessed syncopal fall in her home this morning +HT, +LOC, -AC.  She lives alone in her home and her daughter was worried she wasn't answering her phone. Her daughter went over to her home to find her lying on the floor. Patient did not recall what happened, did not remember falling or feeling dizzy beforehand She is unsure how or where she fell. She complained of right hip pain and left rib pain. Daughter was able to get her up but then called EMS. Trauma assessment in ED: ABCs intact , GCS 15 , AAOx3.  Currently denies HA, dizziness or visual changes.    PAST MEDICAL & SURGICAL HISTORY:  Anxiety  Depression, major  Eye surgery    Home Medications:  Lexapro 20 mg oral tablet: 1 tab(s) orally once a day (08 Mar 2025 18:31)  mirtazapine 7.5 mg oral tablet: 2 tab(s) orally once a day (08 Mar 2025 18:32)  Seroquel 100 mg oral tablet: 1 tab(s) orally once a day (at bedtime) (08 Mar 2025 18:32)  Xanax 0.5 mg oral tablet: 1 tab(s) orally once a day (at bedtime) (08 Mar 2025 18:32)      Allergies    No Known Allergies    Intolerances    ROS:  [X] A ten-point review of systems is negative except as noted   [  ] Due to altered mental status/intubation, subjective information were not able to be obtained from the patient. History was obtained, to the extent possible, from review of the chart and collateral sources of information    MEDICATIONS  (STANDING):  ALPRAZolam 0.5 milliGRAM(s) Oral daily  escitalopram 20 milliGRAM(s) Oral daily  lactated ringers. 1000 milliLiter(s) (50 mL/Hr) IV Continuous <Continuous>  mirtazapine 7.5 milliGRAM(s) Oral daily  QUEtiapine 100 milliGRAM(s) Oral at bedtime    MEDICATIONS  (PRN):  acetaminophen     Tablet .. 650 milliGRAM(s) Oral every 6 hours PRN Mild Pain (1 - 3)      ICU Vital Signs Last 24 Hrs  T(C): 36.8 (08 Mar 2025 16:08), Max: 36.8 (08 Mar 2025 16:08)  T(F): 98.2 (08 Mar 2025 16:08), Max: 98.2 (08 Mar 2025 16:08)  HR: 94 (08 Mar 2025 16:08) (74 - 94)  BP: 122/78 (08 Mar 2025 16:08) (115/74 - 122/78)  BP(mean): --  ABP: --  ABP(mean): --  RR: 18 (08 Mar 2025 16:08) (18 - 18)  SpO2: 97% (08 Mar 2025 16:08) (95% - 97%)    O2 Parameters below as of 08 Mar 2025 16:08  Patient On (Oxygen Delivery Method): room air      I&O's Detail                            14.4   11.70 )-----------( 218      ( 08 Mar 2025 15:27 )             43.5     03-08    133[L]  |  96[L]  |  10  ----------------------------<  65[L]  5.1[H]   |  22  |  0.6[L]    Ca    9.1      08 Mar 2025 15:27    TPro  6.7  /  Alb  4.8  /  TBili  0.6  /  DBili  x   /  AST  48[H]  /  ALT  35  /  AlkPhos  98  03-08        Urinalysis Basic - ( 08 Mar 2025 15:27 )    Color: x / Appearance: x / SG: x / pH: x  Gluc: 65 mg/dL / Ketone: x  / Bili: x / Urobili: x   Blood: x / Protein: x / Nitrite: x   Leuk Esterase: x / RBC: x / WBC x   Sq Epi: x / Non Sq Epi: x / Bacteria: x        On PE:    A&Ox3 with clear speech  Pupils reactive  Tracks  No droop  tongue midline  No drift  Finger to nose intact  GERARDO - good strength  Follows complex commands      Radiology:    < from: CT Head No Cont (03.08.25 @ 16:26) >  CT HEAD:    1.  Acute hematomawith likely subarachnoid and intraparenchymal   components layering in the right anterior sylvian fissure and involving   the anteroinferior right temporal lobe.  2.  Predominantly hypodense subdural hematoma along the left   frontoparietal convexitymeasuring up to 1.7 cm in greatest diameter and   associated with a 0.6 cm rightward midline shift. Probable trace   hyperdense medial component. Findings compatible with chronic left   subdural hematoma with trace acute or subacute hemorrhage.    < end of copied text >    Assessment:  As above    Plan:  Films reviewed with Dr. Parker  As per Dr. Parker, No acute Neurosurgical intervention  Keppra  Repeat HCT in 6 hours from intial.  If repeat HCT stable, OK for q4hr neuro checks HPI:    74yF w/ PMHx of anxiety and depression seen as a trauma consult s/p unwitnessed syncopal fall in her home this morning +HT, +LOC, -AC.  She lives alone in her home and her daughter was worried she wasn't answering her phone. Her daughter went over to her home to find her lying on the floor. Patient did not recall what happened, did not remember falling or feeling dizzy beforehand She is unsure how or where she fell. She complained of right hip pain and left rib pain. Daughter was able to get her up but then called EMS. Trauma assessment in ED: ABCs intact , GCS 15 , AAOx3.  Currently denies HA, dizziness or visual changes.    PAST MEDICAL & SURGICAL HISTORY:  Anxiety  Depression, major  Eye surgery    Home Medications:  Lexapro 20 mg oral tablet: 1 tab(s) orally once a day (08 Mar 2025 18:31)  mirtazapine 7.5 mg oral tablet: 2 tab(s) orally once a day (08 Mar 2025 18:32)  Seroquel 100 mg oral tablet: 1 tab(s) orally once a day (at bedtime) (08 Mar 2025 18:32)  Xanax 0.5 mg oral tablet: 1 tab(s) orally once a day (at bedtime) (08 Mar 2025 18:32)      Allergies    No Known Allergies    Intolerances    ROS:  [X] A ten-point review of systems is negative except as noted   [  ] Due to altered mental status/intubation, subjective information were not able to be obtained from the patient. History was obtained, to the extent possible, from review of the chart and collateral sources of information    MEDICATIONS  (STANDING):  ALPRAZolam 0.5 milliGRAM(s) Oral daily  escitalopram 20 milliGRAM(s) Oral daily  lactated ringers. 1000 milliLiter(s) (50 mL/Hr) IV Continuous <Continuous>  mirtazapine 7.5 milliGRAM(s) Oral daily  QUEtiapine 100 milliGRAM(s) Oral at bedtime    MEDICATIONS  (PRN):  acetaminophen     Tablet .. 650 milliGRAM(s) Oral every 6 hours PRN Mild Pain (1 - 3)      ICU Vital Signs Last 24 Hrs  T(C): 36.8 (08 Mar 2025 16:08), Max: 36.8 (08 Mar 2025 16:08)  T(F): 98.2 (08 Mar 2025 16:08), Max: 98.2 (08 Mar 2025 16:08)  HR: 94 (08 Mar 2025 16:08) (74 - 94)  BP: 122/78 (08 Mar 2025 16:08) (115/74 - 122/78)  BP(mean): --  ABP: --  ABP(mean): --  RR: 18 (08 Mar 2025 16:08) (18 - 18)  SpO2: 97% (08 Mar 2025 16:08) (95% - 97%)    O2 Parameters below as of 08 Mar 2025 16:08  Patient On (Oxygen Delivery Method): room air      I&O's Detail                            14.4   11.70 )-----------( 218      ( 08 Mar 2025 15:27 )             43.5     03-08    133[L]  |  96[L]  |  10  ----------------------------<  65[L]  5.1[H]   |  22  |  0.6[L]    Ca    9.1      08 Mar 2025 15:27    TPro  6.7  /  Alb  4.8  /  TBili  0.6  /  DBili  x   /  AST  48[H]  /  ALT  35  /  AlkPhos  98  03-08        Urinalysis Basic - ( 08 Mar 2025 15:27 )    Color: x / Appearance: x / SG: x / pH: x  Gluc: 65 mg/dL / Ketone: x  / Bili: x / Urobili: x   Blood: x / Protein: x / Nitrite: x   Leuk Esterase: x / RBC: x / WBC x   Sq Epi: x / Non Sq Epi: x / Bacteria: x        On PE:    A&Ox3 with clear speech  Pupils reactive  Tracks  No droop  tongue midline  No drift  Finger to nose intact  GERARDO - good strength  Follows complex commands      Radiology:    < from: CT Head No Cont (03.08.25 @ 16:26) >  CT HEAD:    1.  Acute hematomawith likely subarachnoid and intraparenchymal   components layering in the right anterior sylvian fissure and involving   the anteroinferior right temporal lobe.  2.  Predominantly hypodense subdural hematoma along the left   frontoparietal convexitymeasuring up to 1.7 cm in greatest diameter and   associated with a 0.6 cm rightward midline shift. Probable trace   hyperdense medial component. Findings compatible with chronic left   subdural hematoma with trace acute or subacute hemorrhage.    < end of copied text >    Assessment:  As above    Plan:  Films reviewed with Dr. Parker  As per Dr. Parker, No acute Neurosurgical intervention  Keppra  Repeat HCT in 6 hours from intial.  If repeat HCT stable, OK for q4hr neuro checks  Neuroendovascular consult for MMA Embolization

## 2025-03-08 NOTE — CONSULT NOTE ADULT - SUBJECTIVE AND OBJECTIVE BOX
NELSON EUBANKS   [id number]   [birth year]    74yF  ============================================================   DATE OF INITIAL SICU/SDU CONSULT: 03-08-25    INDICATION FOR SICU CONSULT:       SICU COURSE EVENTS :  03-08 - admitted to SICU service     24HOUR EVENTS  -Admission under SICU service    [X] A ten-point review of systems was negative except as expressed in note.  [X] History was obtained from patient. If unable to participate in their care, history obtained from review of the chart and collateral sources of information.  ============================================================      HPI   74yF w/ PMHx of anxiety and depression seen as a trauma consult s/p unwitnessed syncopal fall in her home this morning +HT, +LOC, -AC.  She lives alone in her home and her daughter was worried she wasn't answering her phone. Her daughter went over to her home to find her lying on the floor. Patient did not recall what happened, did not remember falling or feeling dizzy beforehand She is unsure how or where she fell. She complained of right hip pain and left rib pain. Daughter was able to get her up but then called EMS. Trauma assessment in ED: ABCs intact, GCS 15, AAOx3.    SICU     PAST MEDICAL & SURGICAL HISTORY  Anxiety    Depression, major    HOME MEDICATIONS    Lexapro 20 mg oral tablet: 1 tab(s) orally once a day  mirtazapine 7.5 mg oral tablet: 2 tab(s) orally once a day  Seroquel 100 mg oral tablet: 1 tab(s) orally once a day (at bedtime)  Xanax 0.5 mg oral tablet: 1 tab(s) orally once a day (at bedtime)    ACTIVE MEDICATIONS    acetaminophen     Tablet .. 650 milliGRAM(s) Oral every 6 hours PRN Mild Pain (1 - 3)  ALPRAZolam 0.5 milliGRAM(s) Oral daily  escitalopram 20 milliGRAM(s) Oral daily  lactated ringers. 1000 milliLiter(s) IV Continuous <Continuous>  levETIRAcetam  IVPB 2000 milliGRAM(s) IV Intermittent once  mirtazapine 7.5 milliGRAM(s) Oral daily  QUEtiapine 100 milliGRAM(s) Oral at bedtime    ALLERGIES  Allergies    No Known Allergies    Intolerances         VITAL SIGNS (Last 24Hours)  ICU Vital Signs Last 24 Hrs  T(C): 36.8 (08 Mar 2025 16:08), Max: 36.8 (08 Mar 2025 16:08)  T(F): 98.2 (08 Mar 2025 16:08), Max: 98.2 (08 Mar 2025 16:08)  HR: 94 (08 Mar 2025 16:08) (74 - 94)  BP: 122/78 (08 Mar 2025 16:08) (115/74 - 122/78)  BP(mean): --  ABP: --  ABP(mean): --  RR: 18 (08 Mar 2025 16:08) (18 - 18)  SpO2: 97% (08 Mar 2025 16:08) (95% - 97%)    O2 Parameters below as of 08 Mar 2025 16:08  Patient On (Oxygen Delivery Method): room air            INTAKE/OUTPUT (Last 24Hours)  I&O's Summary      LABS (Last 24Hours)  [03-08 @ 15:27:] Na 133 K 5.1 Cl 96 Mg ___ b>Phos ___ BUN/Cr 10/0.6>>>     [03-08 @ 15:27] AST 48  ALT 35 DBili __  TBili 0.6 Alb 4.8      MECHANICAL VENT/BLOOD GAS  if indicated         PHYSICAL EXAM   PHYSICAL EXAM:    General: Pt lying comfortably in bed.     Neuro:  GCS:  15  = E  4 / V 5  / M  6    Neuro: Alert & oriented x 3, no focal deficits. CNs II-XII intact. PERRLA, EOMs intact. Strength 5/5 throughout, sensory to light touch intact. Small hematoma above right eyebrow without break in skin, not TTP.     Lungs: Clear to auscultation bilaterally, normal expansion/effort. Oxygen delivery: room air.     Cardiovascular : S1, S2.  Regular rate and rhythm. Cardiac Rhythm: NSR  Peripheral edema: none    GI: Abdomen soft, Non-tender, Non-distended.     Wound: Tender to palpation at right hip, ecchymosis noted on right knee without pain.     Extremities: Extremities warm, pink, well-perfused.        - Pulse exam: DP/PT palpable bilaterally.     Derm: Good skin turgor, no skin breakdown.     : Voiding freely.     ----------------------------------------------------------------------------------------------------------  Tubes/Lines/Drains    [x] Peripheral IV    [ ] Urinary Catheter Dickson  [ ]Present on Admission          Insertion Date:                                   [ ] Central Venous Line       [ ]IJ             [ ]Femoral     [ ]Subclavian   [ ]Left  [ ]Right      Insertion Date:          [ ] Arterial Line 	                [ ]Radial    [ ]Femoral     [ ]Axillary         [ ]Left  [ ]Right      Insertion Date:            NELSON EUBANKS   [id number]   [birth year]    74yF  ============================================================   DATE OF INITIAL SICU/SDU CONSULT: 03-08-25    INDICATION FOR SICU CONSULT:       SICU COURSE EVENTS :  03-08 - admitted to SICU service     24HOUR EVENTS  -Admission under SICU service    [X] A ten-point review of systems was negative except as expressed in note.  [X] History was obtained from patient. If unable to participate in their care, history obtained from review of the chart and collateral sources of information.  ============================================================      HPI   74yF w/ PMHx of anxiety and depression seen as a trauma consult s/p unwitnessed syncopal fall in her home this morning +HT, +LOC, -AC.  She lives alone in her home and her daughter was worried she wasn't answering her phone. Her daughter went over to her home to find her lying on the floor. Patient did not recall what happened, did not remember falling or feeling dizzy beforehand She is unsure how or where she fell. She complained of right hip pain and left rib pain. Daughter was able to get her up but then called EMS. Trauma assessment in ED: ABCs intact, GCS 15, AAOx3.    In the ED, CTH showed acute on chronic 1.7cm SDH at left frontal convexity, acute IPH/SAH in right anterior sylvian fissure involving anteroinferior right temporal lobe. Neurosurgery not recommending surgical intervention, Keppra, rpt CTH in 6hrs, and neuroendovascular consult for MMA embolization. CT spine negative. CT C/A/P showed acute, comminuted right femoral greater trochanteric fracture. Ortho consulted, pending formal recs.     SICU consulted for q1h neuro checks and syncopal workup.     PAST MEDICAL & SURGICAL HISTORY  Anxiety    Depression, major    HOME MEDICATIONS    Lexapro 20 mg oral tablet: 1 tab(s) orally once a day  mirtazapine 7.5 mg oral tablet: 2 tab(s) orally once a day  Seroquel 100 mg oral tablet: 1 tab(s) orally once a day (at bedtime)  Xanax 0.5 mg oral tablet: 1 tab(s) orally once a day (at bedtime)    ACTIVE MEDICATIONS    acetaminophen     Tablet .. 650 milliGRAM(s) Oral every 6 hours PRN Mild Pain (1 - 3)  ALPRAZolam 0.5 milliGRAM(s) Oral daily  escitalopram 20 milliGRAM(s) Oral daily  lactated ringers. 1000 milliLiter(s) IV Continuous <Continuous>  levETIRAcetam  IVPB 2000 milliGRAM(s) IV Intermittent once  mirtazapine 7.5 milliGRAM(s) Oral daily  QUEtiapine 100 milliGRAM(s) Oral at bedtime    ALLERGIES  Allergies    No Known Allergies    Intolerances         VITAL SIGNS (Last 24Hours)  ICU Vital Signs Last 24 Hrs  T(C): 36.8 (08 Mar 2025 16:08), Max: 36.8 (08 Mar 2025 16:08)  T(F): 98.2 (08 Mar 2025 16:08), Max: 98.2 (08 Mar 2025 16:08)  HR: 94 (08 Mar 2025 16:08) (74 - 94)  BP: 122/78 (08 Mar 2025 16:08) (115/74 - 122/78)  BP(mean): --  ABP: --  ABP(mean): --  RR: 18 (08 Mar 2025 16:08) (18 - 18)  SpO2: 97% (08 Mar 2025 16:08) (95% - 97%)    O2 Parameters below as of 08 Mar 2025 16:08  Patient On (Oxygen Delivery Method): room air            INTAKE/OUTPUT (Last 24Hours)  I&O's Summary      LABS (Last 24Hours)  [03-08 @ 15:27:] Na 133 K 5.1 Cl 96 Mg ___ b>Phos ___ BUN/Cr 10/0.6>>>     [03-08 @ 15:27] AST 48  ALT 35 DBili __  TBili 0.6 Alb 4.8      MECHANICAL VENT/BLOOD GAS  if indicated         PHYSICAL EXAM   PHYSICAL EXAM:    General: Pt lying comfortably in bed.     Neuro:  GCS:  15  = E  4 / V 5  / M  6    Neuro: Alert & oriented x 3, no focal deficits. CNs II-XII intact. PERRLA, EOMs intact. Strength 5/5 throughout, sensory to light touch intact. Small hematoma above right eyebrow without break in skin, not TTP.     Lungs: Clear to auscultation bilaterally, normal expansion/effort. Oxygen delivery: room air.     Cardiovascular : S1, S2.  Regular rate and rhythm. Cardiac Rhythm: NSR  Peripheral edema: none    GI: Abdomen soft, Non-tender, Non-distended.     Wound: Tender to palpation at right hip, ecchymosis noted on right knee without pain.     Extremities: Extremities warm, pink, well-perfused.        - Pulse exam: DP/PT palpable bilaterally.     Derm: Good skin turgor, no skin breakdown.     : Voiding freely.     ----------------------------------------------------------------------------------------------------------  Tubes/Lines/Drains    [x] Peripheral IV    [ ] Urinary Catheter Dickson  [ ]Present on Admission          Insertion Date:                                   [ ] Central Venous Line       [ ]IJ             [ ]Femoral     [ ]Subclavian   [ ]Left  [ ]Right      Insertion Date:          [ ] Arterial Line 	                [ ]Radial    [ ]Femoral     [ ]Axillary         [ ]Left  [ ]Right      Insertion Date:            NELSON EUBANKS   [id number]   [birth year]    74yF  ============================================================   DATE OF INITIAL SICU/SDU CONSULT: 03-08-25    INDICATION FOR SICU CONSULT:       SICU COURSE EVENTS :  03-08 - admitted to SICU service     24HOUR EVENTS  -Admission under SICU service    [X] A ten-point review of systems was negative except as expressed in note.  [X] History was obtained from patient. If unable to participate in their care, history obtained from review of the chart and collateral sources of information.  ============================================================      HPI   74yF w/ PMHx of anxiety and depression seen as a trauma consult s/p unwitnessed syncopal fall in her home this morning +HT, +LOC, -AC.  She lives alone in her home and her daughter was worried she wasn't answering her phone. Her daughter went over to her home to find her lying on the floor. Patient did not recall what happened, did not remember falling or feeling dizzy beforehand She is unsure how or where she fell. She complained of right hip pain and left rib pain. Daughter was able to get her up but then called EMS. Trauma assessment in ED: ABCs intact, GCS 15, AAOx3.    In the ED, WBC 11, afebrile, normotensive, NSR. Lactate elevated to 3.1, . CTH showed acute on chronic 1.7cm SDH at left frontal convexity, acute IPH/SAH in right anterior sylvian fissure involving anteroinferior right temporal lobe. Neurosurgery not recommending surgical intervention, Darlene, mignon CTH in 6hrs, and neuroendovascular consult for MMA embolization. CT spine negative. CT C/A/P showed acute, comminuted right femoral greater trochanteric fracture. Ortho consulted, pending formal recs.     SICU consulted for q1h neuro checks and syncopal workup.     PAST MEDICAL & SURGICAL HISTORY  Anxiety    Depression, major    HOME MEDICATIONS    Lexapro 20 mg oral tablet: 1 tab(s) orally once a day  mirtazapine 7.5 mg oral tablet: 2 tab(s) orally once a day  Seroquel 100 mg oral tablet: 1 tab(s) orally once a day (at bedtime)  Xanax 0.5 mg oral tablet: 1 tab(s) orally once a day (at bedtime)    ACTIVE MEDICATIONS    acetaminophen     Tablet .. 650 milliGRAM(s) Oral every 6 hours PRN Mild Pain (1 - 3)  ALPRAZolam 0.5 milliGRAM(s) Oral daily  escitalopram 20 milliGRAM(s) Oral daily  lactated ringers. 1000 milliLiter(s) IV Continuous <Continuous>  levETIRAcetam  IVPB 2000 milliGRAM(s) IV Intermittent once  mirtazapine 7.5 milliGRAM(s) Oral daily  QUEtiapine 100 milliGRAM(s) Oral at bedtime    ALLERGIES  Allergies    No Known Allergies    Intolerances         VITAL SIGNS (Last 24Hours)  ICU Vital Signs Last 24 Hrs  T(C): 36.8 (08 Mar 2025 16:08), Max: 36.8 (08 Mar 2025 16:08)  T(F): 98.2 (08 Mar 2025 16:08), Max: 98.2 (08 Mar 2025 16:08)  HR: 94 (08 Mar 2025 16:08) (74 - 94)  BP: 122/78 (08 Mar 2025 16:08) (115/74 - 122/78)  BP(mean): --  ABP: --  ABP(mean): --  RR: 18 (08 Mar 2025 16:08) (18 - 18)  SpO2: 97% (08 Mar 2025 16:08) (95% - 97%)    O2 Parameters below as of 08 Mar 2025 16:08  Patient On (Oxygen Delivery Method): room air            INTAKE/OUTPUT (Last 24Hours)  I&O's Summary      LABS (Last 24Hours)  [03-08 @ 15:27:] Na 133 K 5.1 Cl 96 Mg ___ b>Phos ___ BUN/Cr 10/0.6>>>     [03-08 @ 15:27] AST 48  ALT 35 DBili __  TBili 0.6 Alb 4.8      MECHANICAL VENT/BLOOD GAS  if indicated         PHYSICAL EXAM   PHYSICAL EXAM:    General: Pt lying comfortably in bed.     Neuro:  GCS:  15  = E  4 / V 5  / M  6    Neuro: Alert & oriented x 3, no focal deficits. CNs II-XII intact. PERRLA, EOMs intact. Strength 5/5 throughout, sensory to light touch intact. Small hematoma above right eyebrow without break in skin, not TTP.     Lungs: Clear to auscultation bilaterally, normal expansion/effort. Oxygen delivery: room air.     Cardiovascular : S1, S2.  Regular rate and rhythm. Cardiac Rhythm: NSR  Peripheral edema: none    GI: Abdomen soft, Non-tender, Non-distended.     Wound: Tender to palpation at right hip, ecchymosis noted on right knee without pain.     Extremities: Extremities warm, pink, well-perfused.        - Pulse exam: DP/PT palpable bilaterally.     Derm: Good skin turgor, no skin breakdown.     : Voiding freely.     ----------------------------------------------------------------------------------------------------------  Tubes/Lines/Drains    [x] Peripheral IV    [ ] Urinary Catheter Dickson  [ ]Present on Admission          Insertion Date:                                   [ ] Central Venous Line       [ ]IJ             [ ]Femoral     [ ]Subclavian   [ ]Left  [ ]Right      Insertion Date:          [ ] Arterial Line 	                [ ]Radial    [ ]Femoral     [ ]Axillary         [ ]Left  [ ]Right      Insertion Date:

## 2025-03-08 NOTE — H&P ADULT - NSHPLABSRESULTS_GEN_ALL_CORE
Labs:  CAPILLARY BLOOD GLUCOSE               14.4   11.70 )-----------( 218      ( 08 Mar 2025 15:27 )             43.5         03-08    133[L]  |  96[L]  |  10  ----------------------------<  65[L]  5.1[H]   |  22  |  0.6[L]      Calcium: 9.1 mg/dL (03-08-25 @ 15:27)      LFTs:             6.7  | 0.6  | 48       ------------------[98      ( 08 Mar 2025 15:27 )  4.8  | x    | 35          Lipase:48     Amylase:x         Lactate, Blood: 3.1 mmol/L (03-08-25 @ 15:27)      Coags:     9.90   ----< 0.84    ( 08 Mar 2025 15:27 )     31.7     Urinalysis Basic - ( 08 Mar 2025 15:27 )    Color: x / Appearance: x / SG: x / pH: x  Gluc: 65 mg/dL / Ketone: x  / Bili: x / Urobili: x   Blood: x / Protein: x / Nitrite: x   Leuk Esterase: x / RBC: x / WBC x   Sq Epi: x / Non Sq Epi: x / Bacteria: x    RADIOLOGY:   < from: Xray Pelvis AP only (03.08.25 @ 16:06) >    Possible cortical irregularity at right greater trochanter, could   represent greater trochanteric fracture; attention on scheduled CT will   be made.    < end of copied text >    < from: CT Head No Cont (03.08.25 @ 16:26) >    CT HEAD:    1.  Acute hematomawith likely subarachnoid and intraparenchymal   components layering in the right anterior sylvian fissure and involving   the anteroinferior right temporal lobe.  2.  Predominantly hypodense subdural hematoma along the left   frontoparietal convexity measuring up to 1.7 cm in greatest diameter and   associated with a 0.6 cm rightward midline shift. Probable trace   hyperdense medial component. Findings compatible with chronic left   subdural hematoma with trace acute or subacute hemorrhage.  CT CERVICAL SPINE:  1.  No acute cervical fracture or facet subluxation.    < end of copied text >    < from: CT Chest w/ IV Cont (03.08.25 @ 16:37) >    IMPRESSION:  Acute, comminuted right femoral GREATER trochanteric fracture.    < end of copied text >

## 2025-03-08 NOTE — ED PROVIDER NOTE - PHYSICAL EXAMINATION
GENERAL: Well-developed; well-nourished; in no acute distress. AOx3  SKIN: warm, well perfused  HEAD: hematoma to the right forehead  EYES: no conjunctival erythema, ocular motions intact and appropriate  ENT: airway clear.  CARD: Regular rate and rhythm.   RESP: LCTAB; No wheezes or crackles  ABD: soft and nondistended. mild ttp of palpation of the left lower quad  Upper EXT: moving b/l UEs. Rad pulses 2+ symm, sensation intact and symm  Lower EXT: moving b/l LEs, sensation intact and symm. mild ttp of the right hip. bearing weight.  NEURO: CN2-12 intact and approp

## 2025-03-08 NOTE — ED PROVIDER NOTE - OBJECTIVE STATEMENT
74-year-old female, past medical history of anxiety, depression, not on AC, comes in status post unwitnessed fall at home.  Patient sister found patient on the floor around 11:30 AM today.  Patient has head trauma with a bump to the right forehead, + LOC.  Patient could not explaining what happened and does not remember the fall.  Patient mentions she did have some alcohol this morning around 9 AM.  Denies fevers, chills, chest pain, nausea vomiting, dysuria, hematuria, diarrhea, constipation.

## 2025-03-08 NOTE — CONSULT NOTE ADULT - SUBJECTIVE AND OBJECTIVE BOX
ORTHOPAEDIC SURGERY CONSULT NOTE    Reason for Consult: Right femur greater trochanter fracture     HPI: 74yFemale s/p unwitnessed syncopal fall in her home this morning +HT, +LOC, -AC. with pain in her right side and hip after a ground level fall today. She reports she was able to ambulate after the fall.   Denies pain elsewhere. Denies paresthesias.     PAST MEDICAL & SURGICAL HISTORY:  Anxiety      Depression, major        Allergies: No Known Allergies    Medications: acetaminophen     Tablet .. 650 milliGRAM(s) Oral every 6 hours PRN  ALPRAZolam 0.5 milliGRAM(s) Oral daily  chlorhexidine 2% Cloths 1 Application(s) Topical daily  escitalopram 20 milliGRAM(s) Oral daily  levETIRAcetam 500 milliGRAM(s) Oral every 12 hours  lidocaine   4% Patch 1 Patch Transdermal daily PRN  mirtazapine 7.5 milliGRAM(s) Oral daily  QUEtiapine 100 milliGRAM(s) Oral at bedtime  sodium chloride 0.9%. 1000 milliLiter(s) IV Continuous <Continuous>      PHYSICAL EXAM:  Vital Signs Last 24 Hrs  T(C): 36.9 (08 Mar 2025 19:06), Max: 36.9 (08 Mar 2025 19:06)  T(F): 98.4 (08 Mar 2025 19:06), Max: 98.4 (08 Mar 2025 19:06)  HR: 90 (08 Mar 2025 21:04) (74 - 94)  BP: 124/77 (08 Mar 2025 21:04) (115/74 - 124/77)  BP(mean): --  RR: 18 (08 Mar 2025 21:04) (18 - 18)  SpO2: 97% (08 Mar 2025 21:04) (95% - 98%)    Parameters below as of 08 Mar 2025 21:04  Patient On (Oxygen Delivery Method): room air        Physical Exam:  Alert, NAD  Resp: NLB on RA.    PELVIS: No open skin or wounds. Pelvis stable to AP and Lat compression. Able to actively SLR bilaterally.     RUE:  No open skin or wounds  NTTP shoulder, upper arm, elbow, forearm, wrist or hand  Full baseline painless ROM at shoulder, elbow, wrist, and   SILT in axillary, musculocutaneous,  radial, median, and ulnar distributions.   AIN/PIN/U motor intact  2+ radial pulse with brisk cap refill at distal finger tips.   Compartments soft and compressible.    LUE:  No open skin or wounds  NTTP shoulder, upper arm, elbow, forearm, wrist or hand  Full baseline painless ROM at shoulder, elbow, wrist, and   SILT in axillary, musculocutaneous,  radial, median, and ulnar distributions.   AIN/PIN/U motor intact  2+ radial pulse with brisk cap refill at distal finger tips.   Compartments soft and compressible.    RLE:  No open skin or wounds  TTP over GT  NTTP throughout rest of extremity   Full baseline painless ROM knee, ankle and toes   Mild pain with hip ROM, able to flex to 90  No pain with log-roll or axial compression  Able to actively SLR.  SILT DP/SP/T/Isaac/Sa.   EHL/FHL/TA/Gs motor intact.  2+ DP/PT pulses with brisk cap refill distally.  Compartments soft and compressible.   No pain on passive stretch.    LLE:   No open skin or wounds  NTTP hip, thigh, knee, leg, ankle or foot.   Full baseline painless ROM at hip, knee, ankle and toes   No pain with log-roll or axial compression  Able to actively SLR.  SILT DP/SP/T/Isaac/Sa.   EHL/FHL/TA/Gs motor intact.  2+ DP/PT pulses with brisk cap refill distally.  Compartments soft and compressible. No pain on passive stretch.    Labs:                        14.4   11.70 )-----------( 218      ( 08 Mar 2025 15:27 )             43.5     03-08    133[L]  |  96[L]  |  10  ----------------------------<  65[L]  5.1[H]   |  22  |  0.6[L]    Ca    9.1      08 Mar 2025 15:27    TPro  6.7  /  Alb  4.8  /  TBili  0.6  /  DBili  x   /  AST  48[H]  /  ALT  35  /  AlkPhos  98  03-08    PT/INR - ( 08 Mar 2025 15:27 )   PT: 9.90 sec;   INR: 0.84 ratio         PTT - ( 08 Mar 2025 15:27 )  PTT:31.7 sec    Imaging:  XR, CT pelvis:  - Minimally displaced right greater trochanter fracture   - CT does not demonstrate intertrochanteric extension    A/P: 74y Female with right hip GT fracture with no evidence of intertrochanteric extension. Plan for trial of non operative management.      - WBAT RLE  - Pain control  - PT/OT  - Extremity icing/elevation  - Rest of management per primary team

## 2025-03-08 NOTE — ED PROVIDER NOTE - CLINICAL SUMMARY MEDICAL DECISION MAKING FREE TEXT BOX
7-year-old female presents to the Emergency Department for evaluation of fall at home found by family member on the ground unable to arise.  The patient is a history of daytime alcohol use and lives alone.  In the emergency department had screening exam, labs and imaging, differential includes dehydration, brain injury, EKG abnormalities, rhabdomyolysis, renal insufficiency, closed head injury.    She was found to have subdural with subarachnoid hemorrhage on screening CAT scan without fracture, remainder of labs otherwise grossly unremarkable, clinical status unchanged during stay.  Had neurosurgery consult and admission for continued management.    Plan discussed with patient family bedside at the time of disposition who expressed understanding with medical decision making and the findings of the ED workup. 74 year-old female presents to the Emergency Department for evaluation of fall at home found by family member on the ground unable to arise.  The patient is a history of daytime alcohol use and lives alone.  In the emergency department had screening exam, labs and imaging, differential includes dehydration, brain injury, EKG abnormalities, rhabdomyolysis, renal insufficiency, closed head injury.    She was found to have subdural with subarachnoid hemorrhage on screening CAT scan without fracture, remainder of labs otherwise grossly unremarkable, clinical status unchanged during stay.  Had neurosurgery consult and admission for continued management.    Plan discussed with patient family bedside at the time of disposition who expressed understanding with medical decision making and the findings of the ED workup.

## 2025-03-08 NOTE — ED PROVIDER NOTE - PROGRESS NOTE DETAILS
Dr. Garcia: Imaging noted with acute hematoma and potential shift, neurosurgery consulted, patient is unchanged clinically bedside.  Pending recommendations from neurosurgery. Shane Crowder, PGY2 Resident:  patient had subdural with midline shift along with acute subarachnoid/intaparenchymal bleed. patient alert and awake at this time, keppra ordered and given in the setting of midline shift.  neuro consulted, no neurosurg intervention at this time. trauma consulted, will be admitted to sicu. ortho made aware of right femoral greater trochanteric fracture.

## 2025-03-08 NOTE — ED ADULT NURSE NOTE - OBJECTIVE STATEMENT
As per her sister patient was found in the floor at home unable to get up , was assisted  by her sister ,pt does not  know what  happen  ,history of multiple falls in the past and dizziness ,patient noted with lump on the right side face no  blood thinners medication  denies chest pain denies headache no N/V no fever .

## 2025-03-08 NOTE — ED PROVIDER NOTE - ATTENDING CONTRIBUTION TO CARE
I personally evaluated the patient. I reviewed the Resident's or Physician Assistant's note (as assigned above), and agree with the findings and plan except as documented in my note.    74-year-old female presents to the emergency department for evaluation of fall.  She was found on the floor by her sister today.  She has a history of anxiety and depression.  She takes no anticoagulants.    History of fall unknown by patient, sister is bedside to assist with HPI states there are recurrent falls and also alcohol use in the a.m.  The patient lives at home by herself.    GENERAL: female in no distress.   HEENT: EOMI no nystagmus right lateral frontal ecchymosis noted no jaw malocclusion no entrapment  CHEST: normal work of breathing noted  CV: pulses intact   EXTR: FROM   NEURO: AAO 3 no focal deficits  SKIN: normal no pallor   PSYCH: Anxious mood & mentation, moderate insight to illness    Impression: Fall, closed head injury        Plan: IV, labs, imaging, supportive care & reevaluation

## 2025-03-08 NOTE — CONSULT NOTE ADULT - ATTENDING COMMENTS
Orthopedic Trauma Attending  Patient seen and examined.  PMHx, Psurg Hx, Medications and Allergies reviewed.  X-rays and CT reviewed.  Agree with findings, assessment and plan.  Impression: greater troch fx.  low likelihood of propagation based on imaging and PE.  Would allow WBAT.  Follow serial x-rays.  If patient develops increasingin groin pain, evaluate sooner.  Plan reviewed with patient.  Questions answered.  Agrees with plan.

## 2025-03-08 NOTE — ED ADULT TRIAGE NOTE - CHIEF COMPLAINT QUOTE
pt got up off the couch to answer the phone. pt didn't answer so her sister went to house and found pt sleeping on floor. pt does not remember how she got on the floor and does not remember falling pt has bump to the right side of her forehead. family unsure if she was drinking today. empty wine bottles found in the house. no anti coag. unknown loc

## 2025-03-08 NOTE — H&P ADULT - HISTORY OF PRESENT ILLNESS
TRAUMA ACTIVATION LEVEL:  CONSULT  ACTIVATED BY: ED  INTUBATED: NO      MECHANISM OF INJURY:   [] Blunt     [] MVC	  [XX] Fall	  [] Pedestrian Struck	      GCS: 15 	E: 4	V: 5	M: 6    HPI:    74yF w/ PMHx of anxiety and depression seen as a trauma consult s/p unwitnessed syncopal fall in her home this morning +HT, +LOC, -AC.  She lives alone in her home and her daughter was worried she wasn't answering her phone. Her daughter went over to her home to find her lying on the floor. Patient did not recall what happened, did not remember falling or feeling dizzy beforehand She is unsure how or where she fell. She complained of right hip pain and left rib pain. Daughter was able to get her up but then called EMS. Trauma assessment in ED: ABCs intact , GCS 15 , AAOx3.

## 2025-03-08 NOTE — H&P ADULT - NSHPPHYSICALEXAM_GEN_ALL_CORE
Secondary Survey:   General: NAD  HEENT: Normocephalic, right frontal scalp hematoma. soft non expanding EOMI, PEERLA. no scalp lacerations   Neck: Soft, midline trachea. no c-spine tenderness  Chest: left lateral chest tenderness, no subcutaneous emphysema   Cardiac: S1, S2, RRR  Respiratory: Bilateral breath sounds, clear and equal bilaterally  Abdomen: Soft, non-distended, non-tender, no rebound, no guarding.  Groin: Normal appearing, pelvis stable   Ext:  Moving b/l upper and lower extremities. Palpable Radial b/l UE, b/l DP palpable in LE. no hip pain. full ROM of b/l hips   Back: No T/L/S spine tenderness, No palpable runoff/stepoff/deformity

## 2025-03-08 NOTE — H&P ADULT - ASSESSMENT
ASSESSMENT:  74y Female  w/ PMHx of *** seen as (Code Trauma / Trauma Alert / Trauma Consult) s/p ****** with complaint of *** , external signs of trauma include *** . Trauma assessment in ED: ABCs intact , GCS 15 , AAOx3,  GERARDO.     Injuries identified:   -   -   -     PLAN:   - Trauma Labs: (CBC, BMP, Coags, T&S, UA, EtOH level)  Additional studies:  EKG  Utox    Trauma Imaging to include the following:  - CXR, Pelvic Xray  - CT Head,  CT C-spine, CT Chest, CT Abd/Pelvis  - Extremity films: right hip    Additional consultations:  - Neurosurgery  - Orthopedics     Admission to SICU   q1h neuro checks   f/u neurosurgery reccs. Loaded with 2g Keppra in ED   no AC. No reversal needed   Will need Repeat CTH 8:30 pm   Home meds restarted. (seroquel, mirtazapine, lexapro, xanax)   will need syncope workup (echo , orthostatics)   Ortho consult for hip fx- NWB for now   keep NPO until repeat CT   holding DVT ppx   needs UA   no abx at this time         Above plan discussed with Trauma attending, Dr. Bhatia  , patient, patient family, and ED team  --------------------------------------------------------------------------------------  03-08-25 @ 18:21 ASSESSMENT:  74y Female  w/ PMHx of *** seen as (Code Trauma / Trauma Alert / Trauma Consult) s/p ****** with complaint of *** , external signs of trauma include *** . Trauma assessment in ED: ABCs intact , GCS 15 , AAOx3,  GERARDO.     Injuries identified:   - SDH  -   -     PLAN:   - Trauma Labs: (CBC, BMP, Coags, T&S, UA, EtOH level)  Additional studies:  EKG  Utox    Trauma Imaging to include the following:  - CXR, Pelvic Xray  - CT Head,  CT C-spine, CT Chest, CT Abd/Pelvis  - Extremity films: right hip    Additional consultations:  - Neurosurgery  - Orthopedics     Admission to SICU   q1h neuro checks   f/u neurosurgery reccs. Loaded with 2g Keppra in ED   no AC. No reversal needed   Will need Repeat CTH 8:30 pm   Home meds restarted. (seroquel, mirtazapine, lexapro, xanax)   will need syncope workup (echo , orthostatics)   Ortho consult for hip fx- NWB for now   keep NPO until repeat CT   holding DVT ppx   needs UA   no abx at this time         Above plan discussed with Trauma attending, Dr. Bhatia  , patient, patient family, and ED team  --------------------------------------------------------------------------------------  03-08-25 @ 18:21

## 2025-03-08 NOTE — H&P ADULT - ATTENDING COMMENTS
Trauma Attending H&P Attestation    Patient seen and evaluated with the trauma team in the trauma bay upon arrival. All pertinent labs and radiographic imaging reviewed, pending final reports. Outpatient medications reviewed, including the presence of anticoagulants, if applicable. I agree with the resident's note above, including the physical exam findings, assessment and plan as documented with the following adjustments.     Trauma Level: [x ] Code  [ ] Alert  [ ] Consult [ ] Transfer in  Patient is seen at the bedside at   Activation by:  [ x] ED physician [ ] EMS  Intubated in Field? [ ] Yes [ x] No  Intubated in ED? [ ] Yes [x ] No  Intubated in Trauma Divide? [ ] Yes [x ] No    NELSON EUBANKS Patient is a 74y old  Female who presents with a chief complaint of SDH, SAH, IPH s/p fall, right hip fx (08 Mar 2025 20:45)      Patient presented with GCS [ ]  upon arrival to the trauma bay.  Allergies    No Known Allergies    Intolerances      On AC/Antiplatelets [x ] Yes [ ] No              [ ] NOVACs, [ ] Coumadin, [ ] ASA, [x ] Antiplatelets   Vital Signs Last 24 Hrs  T(C): 36.9 (08 Mar 2025 19:06), Max: 36.9 (08 Mar 2025 19:06)  T(F): 98.4 (08 Mar 2025 19:06), Max: 98.4 (08 Mar 2025 19:06)  HR: 100 (09 Mar 2025 00:00) (74 - 100)  BP: 114/56 (09 Mar 2025 00:00) (114/56 - 124/77)  BP(mean): 78 (09 Mar 2025 00:00) (78 - 83)  RR: 19 (09 Mar 2025 00:00) (17 - 19)  SpO2: 99% (09 Mar 2025 00:00) (95% - 99%)    Parameters below as of 08 Mar 2025 21:04  Patient On (Oxygen Delivery Method): room air      PE:  Assessment:   PLAN  - supportive care  - GI/DVT prophylaxis  - pain management  - repeat studies as needed  - complete and follow up on trauma work up included but not limites to                          [x ] CXR [x ] PXR [ ] Extremities X-RAYs                          [x ] NCHCT [ x] C-Spine CT [x ] CT Chestx [ ] CT Abdomen/Pelvis   [ ] FAST [ ] Other                          [ ] Trauma Labs    [ ] Toxicology                     I independently read and reviewed the above studies   - Follow up Consults  [ ] Neurosurgery [ ] Orthopaedics [ ] Plastics [ ] Fascial/OMFS [ ] Opthalmology   [ ] Urology  [ ] ENT  [ ] Pediatric ICU                                         [ ] SICU/SDU [ ] Burn/Burn ICU [ ] Medicine [ ] Geriatrics [ ] Cardiology/EP [ ] Hospice/Palliative Care                           - IV ABx give as indicated [ x] Yes [ ] No  - Tetanus given as indicated [ x] Yes [ ] No  - Seizures prophylaxis [ x] Yes,  [ ] No    Jen Bhatia MD, FACS  Trauma/ACS/Surgical Critical Care Attending

## 2025-03-08 NOTE — ED PROVIDER NOTE - CONSIDERATION OF ADMISSION OBSERVATION
Patient be admitted for new subdural hematoma with subarachnoid hemorrhage in the setting of a recent head injury.  No other signs of trauma Consideration of Admission/Observation

## 2025-03-09 LAB
ALBUMIN SERPL ELPH-MCNC: 3.9 G/DL — SIGNIFICANT CHANGE UP (ref 3.5–5.2)
ALP SERPL-CCNC: 82 U/L — SIGNIFICANT CHANGE UP (ref 30–115)
ALT FLD-CCNC: 29 U/L — SIGNIFICANT CHANGE UP (ref 0–41)
ANION GAP SERPL CALC-SCNC: 17 MMOL/L — HIGH (ref 7–14)
APPEARANCE UR: CLEAR — SIGNIFICANT CHANGE UP
AST SERPL-CCNC: 42 U/L — HIGH (ref 0–41)
BASOPHILS # BLD AUTO: 0.03 K/UL — SIGNIFICANT CHANGE UP (ref 0–0.2)
BASOPHILS NFR BLD AUTO: 0.5 % — SIGNIFICANT CHANGE UP (ref 0–1)
BILIRUB DIRECT SERPL-MCNC: 0.3 MG/DL — SIGNIFICANT CHANGE UP (ref 0–0.3)
BILIRUB INDIRECT FLD-MCNC: 0.6 MG/DL — SIGNIFICANT CHANGE UP (ref 0.2–1.2)
BILIRUB SERPL-MCNC: 0.9 MG/DL — SIGNIFICANT CHANGE UP (ref 0.2–1.2)
BILIRUB UR-MCNC: NEGATIVE — SIGNIFICANT CHANGE UP
BUN SERPL-MCNC: 13 MG/DL — SIGNIFICANT CHANGE UP (ref 10–20)
CALCIUM SERPL-MCNC: 8.5 MG/DL — SIGNIFICANT CHANGE UP (ref 8.4–10.5)
CHLORIDE SERPL-SCNC: 96 MMOL/L — LOW (ref 98–110)
CO2 SERPL-SCNC: 21 MMOL/L — SIGNIFICANT CHANGE UP (ref 17–32)
COLOR SPEC: YELLOW — SIGNIFICANT CHANGE UP
CREAT SERPL-MCNC: 0.6 MG/DL — LOW (ref 0.7–1.5)
DIFF PNL FLD: NEGATIVE — SIGNIFICANT CHANGE UP
EGFR: 94 ML/MIN/1.73M2 — SIGNIFICANT CHANGE UP
EGFR: 94 ML/MIN/1.73M2 — SIGNIFICANT CHANGE UP
EOSINOPHIL # BLD AUTO: 0.01 K/UL — SIGNIFICANT CHANGE UP (ref 0–0.7)
EOSINOPHIL NFR BLD AUTO: 0.2 % — SIGNIFICANT CHANGE UP (ref 0–8)
GLUCOSE SERPL-MCNC: 82 MG/DL — SIGNIFICANT CHANGE UP (ref 70–99)
GLUCOSE UR QL: NEGATIVE MG/DL — SIGNIFICANT CHANGE UP
HCT VFR BLD CALC: 35.1 % — LOW (ref 37–47)
HCT VFR BLD CALC: 37.5 % — SIGNIFICANT CHANGE UP (ref 37–47)
HGB BLD-MCNC: 12 G/DL — SIGNIFICANT CHANGE UP (ref 12–16)
HGB BLD-MCNC: 12.3 G/DL — SIGNIFICANT CHANGE UP (ref 12–16)
IMM GRANULOCYTES NFR BLD AUTO: 0.2 % — SIGNIFICANT CHANGE UP (ref 0.1–0.3)
KETONES UR-MCNC: 40 MG/DL
LACTATE SERPL-SCNC: 1 MMOL/L — SIGNIFICANT CHANGE UP (ref 0.7–2)
LEUKOCYTE ESTERASE UR-ACNC: NEGATIVE — SIGNIFICANT CHANGE UP
LYMPHOCYTES # BLD AUTO: 0.38 K/UL — LOW (ref 1.2–3.4)
LYMPHOCYTES # BLD AUTO: 5.8 % — LOW (ref 20.5–51.1)
MAGNESIUM SERPL-MCNC: 1.7 MG/DL — LOW (ref 1.8–2.4)
MCHC RBC-ENTMCNC: 29.4 PG — SIGNIFICANT CHANGE UP (ref 27–31)
MCHC RBC-ENTMCNC: 29.7 PG — SIGNIFICANT CHANGE UP (ref 27–31)
MCHC RBC-ENTMCNC: 32.8 G/DL — SIGNIFICANT CHANGE UP (ref 32–37)
MCHC RBC-ENTMCNC: 34.2 G/DL — SIGNIFICANT CHANGE UP (ref 32–37)
MCV RBC AUTO: 86.9 FL — SIGNIFICANT CHANGE UP (ref 81–99)
MCV RBC AUTO: 89.5 FL — SIGNIFICANT CHANGE UP (ref 81–99)
MONOCYTES # BLD AUTO: 0.4 K/UL — SIGNIFICANT CHANGE UP (ref 0.1–0.6)
MONOCYTES NFR BLD AUTO: 6.1 % — SIGNIFICANT CHANGE UP (ref 1.7–9.3)
NEUTROPHILS # BLD AUTO: 5.76 K/UL — SIGNIFICANT CHANGE UP (ref 1.4–6.5)
NEUTROPHILS NFR BLD AUTO: 87.2 % — HIGH (ref 42.2–75.2)
NITRITE UR-MCNC: NEGATIVE — SIGNIFICANT CHANGE UP
NRBC BLD AUTO-RTO: 0 /100 WBCS — SIGNIFICANT CHANGE UP (ref 0–0)
NRBC BLD AUTO-RTO: 0 /100 WBCS — SIGNIFICANT CHANGE UP (ref 0–0)
PH UR: 5.5 — SIGNIFICANT CHANGE UP (ref 5–8)
PHOSPHATE SERPL-MCNC: 4.6 MG/DL — SIGNIFICANT CHANGE UP (ref 2.1–4.9)
PLATELET # BLD AUTO: 178 K/UL — SIGNIFICANT CHANGE UP (ref 130–400)
PLATELET # BLD AUTO: 181 K/UL — SIGNIFICANT CHANGE UP (ref 130–400)
PMV BLD: 8.7 FL — SIGNIFICANT CHANGE UP (ref 7.4–10.4)
PMV BLD: 8.8 FL — SIGNIFICANT CHANGE UP (ref 7.4–10.4)
POTASSIUM SERPL-MCNC: 5 MMOL/L — SIGNIFICANT CHANGE UP (ref 3.5–5)
POTASSIUM SERPL-SCNC: 5 MMOL/L — SIGNIFICANT CHANGE UP (ref 3.5–5)
PROT SERPL-MCNC: 5.7 G/DL — LOW (ref 6–8)
PROT UR-MCNC: NEGATIVE MG/DL — SIGNIFICANT CHANGE UP
RBC # BLD: 4.04 M/UL — LOW (ref 4.2–5.4)
RBC # BLD: 4.19 M/UL — LOW (ref 4.2–5.4)
RBC # FLD: 14.2 % — SIGNIFICANT CHANGE UP (ref 11.5–14.5)
RBC # FLD: 14.6 % — HIGH (ref 11.5–14.5)
SODIUM SERPL-SCNC: 134 MMOL/L — LOW (ref 135–146)
SP GR SPEC: 1.03 — SIGNIFICANT CHANGE UP (ref 1–1.03)
UROBILINOGEN FLD QL: 0.2 MG/DL — SIGNIFICANT CHANGE UP (ref 0.2–1)
WBC # BLD: 5.29 K/UL — SIGNIFICANT CHANGE UP (ref 4.8–10.8)
WBC # BLD: 6.59 K/UL — SIGNIFICANT CHANGE UP (ref 4.8–10.8)
WBC # FLD AUTO: 5.29 K/UL — SIGNIFICANT CHANGE UP (ref 4.8–10.8)
WBC # FLD AUTO: 6.59 K/UL — SIGNIFICANT CHANGE UP (ref 4.8–10.8)

## 2025-03-09 PROCEDURE — 93306 TTE W/DOPPLER COMPLETE: CPT | Mod: 26

## 2025-03-09 PROCEDURE — 99291 CRITICAL CARE FIRST HOUR: CPT

## 2025-03-09 RX ORDER — ALPRAZOLAM 0.5 MG
0.5 TABLET, EXTENDED RELEASE 24 HR ORAL ONCE
Refills: 0 | Status: DISCONTINUED | OUTPATIENT
Start: 2025-03-09 | End: 2025-03-09

## 2025-03-09 RX ORDER — MAGNESIUM SULFATE 500 MG/ML
2 SYRINGE (ML) INJECTION ONCE
Refills: 0 | Status: COMPLETED | OUTPATIENT
Start: 2025-03-09 | End: 2025-03-09

## 2025-03-09 RX ORDER — ALPRAZOLAM 0.5 MG
0.5 TABLET, EXTENDED RELEASE 24 HR ORAL EVERY 12 HOURS
Refills: 0 | Status: DISCONTINUED | OUTPATIENT
Start: 2025-03-09 | End: 2025-03-09

## 2025-03-09 RX ORDER — ALPRAZOLAM 0.5 MG
0.5 TABLET, EXTENDED RELEASE 24 HR ORAL EVERY 12 HOURS
Refills: 0 | Status: DISCONTINUED | OUTPATIENT
Start: 2025-03-09 | End: 2025-03-11

## 2025-03-09 RX ORDER — ENOXAPARIN SODIUM 100 MG/ML
40 INJECTION SUBCUTANEOUS EVERY 24 HOURS
Refills: 0 | Status: DISCONTINUED | OUTPATIENT
Start: 2025-03-09 | End: 2025-03-11

## 2025-03-09 RX ADMIN — Medication 650 MILLIGRAM(S): at 11:20

## 2025-03-09 RX ADMIN — LIDOCAINE HYDROCHLORIDE 1 PATCH: 20 JELLY TOPICAL at 11:20

## 2025-03-09 RX ADMIN — ESCITALOPRAM OXALATE 20 MILLIGRAM(S): 20 TABLET ORAL at 11:17

## 2025-03-09 RX ADMIN — LEVETIRACETAM 500 MILLIGRAM(S): 10 INJECTION, SOLUTION INTRAVENOUS at 17:59

## 2025-03-09 RX ADMIN — LEVETIRACETAM 500 MILLIGRAM(S): 10 INJECTION, SOLUTION INTRAVENOUS at 06:10

## 2025-03-09 RX ADMIN — QUETIAPINE FUMARATE 100 MILLIGRAM(S): 25 TABLET ORAL at 21:46

## 2025-03-09 RX ADMIN — MIRTAZAPINE 7.5 MILLIGRAM(S): 30 TABLET, FILM COATED ORAL at 11:18

## 2025-03-09 RX ADMIN — Medication 650 MILLIGRAM(S): at 12:35

## 2025-03-09 RX ADMIN — Medication 25 GRAM(S): at 04:47

## 2025-03-09 RX ADMIN — Medication 1 APPLICATION(S): at 11:23

## 2025-03-09 RX ADMIN — ENOXAPARIN SODIUM 40 MILLIGRAM(S): 100 INJECTION SUBCUTANEOUS at 17:59

## 2025-03-09 RX ADMIN — Medication 0.5 MILLIGRAM(S): at 21:46

## 2025-03-09 RX ADMIN — Medication 0.5 MILLIGRAM(S): at 11:17

## 2025-03-09 RX ADMIN — Medication 100 MILLILITER(S): at 04:47

## 2025-03-09 RX ADMIN — Medication 2 TABLET(S): at 21:45

## 2025-03-09 NOTE — PROGRESS NOTE ADULT - ASSESSMENT
Assessment and Recommendation:   74y Female s/p syncopal fall, admitted to SICU for q1h neuro checks and syncopal workup    NEURO/PSYCH:  #acute on chronic SDH, acute SAH/IPH  - Neurosurgery - no surgical intervention  - neuroendovascular consult pending  - Head CT #2: stable  - q4h neuro checks  - keppra 500mg PO BID  #Acute pain  - acetaminophen 650 milliGRAM(s) Oral every 6 hours PRN Mild Pain (1 - 3)  - lidocaine patch  #PMHx of anxiety/depression  - ALPRAZolam 0.5 milliGRAM(s) Oral daily  - escitalopram 20 milliGRAM(s) Oral daily  - mirtazapine 7.5 milliGRAM(s) Oral daily  - QUEtiapine 100 milliGRAM(s) Oral at bedtime    RESP:   #Oxygenation  - room air  - encourage incentive spirometry  #Activity  - increase as tolerated    CARDIAC:   #syncopal fall  - no cardiac PMHx  - TTE pending  - EKG sinus rhythm    GI/NUTR:   #Diet, Regular  - aspiration precautions, HOB 30  #GI Prophylaxis  - n/a  #Bowel regimen  - senna    /RENAL:   #elevated lactate  - Lactate 3.1 on admission, now downtrending to 1.0  #IVF  - NS at 100mL/hr, IVL once tolerating diet    Labs:          BUN/Cr- 10/0.6  -->,  13/0.6  -->          [03-09 @ 01:05]Na  134 // K  5.0 // Mg  1.7 // Phos  4.6  #Hypomagnesemia    - 2g magnesium sulfate replenished  - CK on admission - 270 > 210      HEME/ONC:   #DVT prophylaxis  - SCDs  - holding chemical PPx in the setting of intracranial bleed    Labs: Hb/Hct:  14.4/43.5  -->,  12.3/37.5  -->                      Plts:  218  -->,  181  -->                 PTT/INR:  31.7/0.84  --->     T&S Expires: 3/12     ID:  #monitor for leukocytosis/fever  - UA pending  #MRSA pending  WBC- 11.70  --->>,  6.59  --->>  Temp trend- 24hrs T(F): 98.4 (03-08 @ 19:06), Max: 98.4 (03-08 @ 19:06)    ENDO:  - Glucose goal 140-180. if above 180, start insulin sliding scale  - FS q6h while NPO    MSK:  #acute right comminuted femoral greater trochanteric fracture  - ortho consulted  - elevation and icing  - RLE WBAT    DERM:  - DTI screen pending    PALLIATIVE/GOALS OF CARE:  - Palliative care not required for pt at this time  - Code Status: full code    LINES/DRAINS:  matias CHERRY (3/9)    HCP/Emergency Contact -     INDICATION FOR SICU:  q1h neuro checks    DISPO:  SICU, Case to be discussed with attending Assessment and Recommendation:   74F s/p fall with acute on chronic SDH, acute SAH/IPH, R intertrochanteric femur fx.     NEURO/PSYCH:  #acute on chronic SDH, acute SAH/IPH  - Neurosurgery - no surgical intervention, q4hr neuro checks, keppra 500bid x days   - neuroendovascular consult pending for possible MMA embolization  - Head CT #2: stable  - q4h neuro checks  #Acute pain  - APAP prn  - lidocaine patch  #PMHx of anxiety/depression  - continue home escitalopram 20mg QD  - continue home mirtazapine 7.5mg QD   - continue home xanax 0.5mg QD  - continue home QUEtiapine 100mg QHS     RESP:   #Oxygenation  - tolerating RA  - encourage incentive spirometry  #Activity  - increase as tolerated    CARDIAC:   #syncopal fall  - no cardiac PMHx  - TTE pending, orthostatics pending  - EKG sinus rhythm    GI/NUTR:   #Diet, Regular  - aspiration precautions, HOB 30  #GI Prophylaxis  - not indicated  #Bowel regimen  - senna  - last BM prior to admission    /RENAL:   #elevated lactate  - Lactate 3.1 on admission, now downtrending to 1.0  #maintain euvolemia   - IVL  #CK on admission  - 270 > 210  - downtrending    Labs:          BUN/Cr- 10/0.6  -->,  13/0.6  -->          [03-09 @ 01:05]Na  134 // K  5.0 // Mg  1.7 // Phos  4.6      HEME/ONC:   #DVT prophylaxis  - mechanical: SCDs  - holding chemical PPx in the setting of intracranial bleed; can likely start today    Labs: Hb/Hct:  14.4/43.5  -->,  12.3/37.5  -->                      Plts:  218  -->,  181  -->                 PTT/INR:  31.7/0.84  --->       ID:  #monitor for leukocytosis/fever  - UA negative   #MRSA pending  WBC- 11.70  --->>,  6.59  --->>  Temp trend- 24hrs T(F): 98.4 (03-08 @ 19:06), Max: 98.4 (03-08 @ 19:06)    ENDO:  #glycemic monitoring  - Glucose goal 140-180. if above 180, start insulin sliding scale    MSK:  #acute right comminuted femoral greater trochanteric fracture  - ortho consulted > non-operative management, elevation and icing  - RLE WBAT  - PT pending     DERM:  #DTI screen negative   - continue to monitor daily skin changes       LINES/DRAINS:  matias CHERRY (3/9)  INDICATION FOR SICU:  q1h neuro checks  DISPO: SICU; likely DG today. Case to be discussed with attending Dr. Gray

## 2025-03-09 NOTE — PROGRESS NOTE ADULT - ATTENDING COMMENTS
The patient is a 74-year-old female, Renée Goddard, living alone who had a syncopal fall at home. She suffered from head trauma and was not responsive. She was found by her daughter who was concerned when she was not answering calls.  - Chief Complaint (CC) : Syncopal fall, head trauma, right femur fracture  - History of Present Illness (HPI) : Renée Goddard had a syncopal fall at home and was non-responsive. She was found to have both subdural and subarachnoid hematomas in relation to her head trauma. She also has a right femur fracture. She presents with no issues in endo. There are discussions on monitoring and management plans including possible surgery.  - Past Medical History : Anxiety, Depression, No reported medical history with cardiology  - Past Surgical History : No information available  - Family History : No information available  - Social History : Patient is a 74-year-old female living alone.  - Review of Systems : Patient has neurological issues and a fracture, but no issues with respiratory, cards, gastrointestinal, renal, or endocrine systems.  - Medications : The patient is on Keppra 500 BID for seven days for seizure prophylaxis in relation to her hematomas. She is also on pain management, including Tylenol and lidocaine patch. Her home medications include Lexapro, Mirtazapime, and Xanax for her anxiety and depression.  - Allergies : No information available  Objective:  - Diagnostic Results : The patient was found to have subdural and subarachnoid hematomas following her traumatic fall. She also has a right femur fracture as confirmed by orthopedics. A neuroendovascular consult has been ordered for MMA embolization.  - Vital Signs : No information available  - Physical Examination (PE) : Neurologically, the patient is intact, GCS 15A and 04. She has also been evaluated by orthopedics for her femur fracture.  Assessment:  - Summary : 74-year-old female, suffered a fall at home causing post-traumatic Subarachnoid Hemorrhage and a right intertrochanteric femoral fracture. She also has Chronic subdural hematoma and is pending syncope workup.  - Problems :  - Subarachnoid Hemorrhage  - Chronic Subdural Hematoma  - Syncopal Episode  - Intertrochanteric fracture    Plan:  - Summary : Upon Neurosurgery request in 24 hours of Q&R monitoring, she can transition her to a step-down unit. Investigate the syncopal episode by obtaining an echo and orthostatics. Lactate and CK levels are downtrending thus we have stopped trending. Continue on seizure prophylaxis and geriatric consultation will be needed to  her conditions at trauma.  - Plan :  - Continue seizure prophylaxis  - Assess for possible surgery  - Progress diet  - DVT prophylaxis  - Geriatric Consult  - Orthostatics  - Echo  - Syncopal workup  - Reassess hip fracture    I have personally seen and examined the patient.  I fully participated in the care of this patient.  I have made amendments to the documentation where necessary, and agree with the history, physical exam, and plan as documented by the Resident.    Critical Care: [id number]  This patient has a high probability of sudden, clinically significant deterioration, which requires the highest level of physician preparedness to intervene urgently. I managed/supervised life or organ supporting interventions that required frequent physician assessment. I devoted my full attention in the ICU to the direct care of this patient for the period of time indicated below. Time I spent with the family or surrogate(s) is included only if the patient was incapable of providing the necessary information or participating in medical decision making. Time devoted to teaching and to any procedures I billed separately is not included.    Patient is examined and evaluated at the bedside with SICU team. Treatment plan discussed with SICU team, nurses and primary team.  Chest X-ray and all relevant studies reviewed during rounds.  Will continue hemodynamic monitoring as per protocol in SICU.    I have personally and independently provided [ 50 ] minutes of critical care services. This excludes any time spent on separate procedures or teaching. I have reviewed and amended the note as needed. Treatment plan discussed with SICU team, nurses and primary team at the time of the multidisciplinary rounds. The above note is NOT written at the time of rounds and will reflect all changes throughout management of the patient for the day note is written for.    Timmy Gray MD, FACS  Trauma/ACS/SICU Attending

## 2025-03-09 NOTE — PROGRESS NOTE ADULT - SUBJECTIVE AND OBJECTIVE BOX
SURGERY PROGRESS NOTE    24 HR EVENTS: Patient alert and oriented x3, moving all extremities to command. Repeat CT head stable. Dickson placed for urinary retention. Patient saturating well on RA, low grade tachycardia to low 100s, otherwise vitals WNL. Lactate and CK downtrending. Labs stable.    OBJECTIVE:  Vital Signs Last 24 Hrs  T(C): 37.1 (09 Mar 2025 06:00), Max: 37.1 (09 Mar 2025 06:00)  T(F): 98.7 (09 Mar 2025 06:00), Max: 98.7 (09 Mar 2025 06:00)  HR: 93 (09 Mar 2025 07:00) (74 - 103)  BP: 97/52 (09 Mar 2025 07:00) (97/52 - 124/77)  BP(mean): 71 (09 Mar 2025 07:00) (71 - 83)  RR: 17 (09 Mar 2025 07:00) (16 - 19)  SpO2: 97% (09 Mar 2025 07:00) (91% - 99%)    Parameters below as of 08 Mar 2025 21:04  Patient On (Oxygen Delivery Method): room air        I&O's Summary    08 Mar 2025 06:01  -  09 Mar 2025 07:00  --------------------------------------------------------  IN: 550 mL / OUT: 1700 mL / NET: -1150 mL        Physical Exam:  General: NAD, awake and alert, oriented x3  HEENT: Normocephalic, right frontal scalp hematoma. EOMI, PEERLA   Cardiac: S1, S2, RRR  Respiratory: Normal work of breathing on RA  Abdomen: Soft, non-distended, non-tender, no rebound, no guarding.  Groin: Normal appearing, pelvis stable   Ext:  Moving b/l upper and lower extremities, sensation grossly intact    LABS:                        12.3   6.59  )-----------( 181      ( 09 Mar 2025 01:05 )             37.5     03-09    134[L]  |  96[L]  |  13  ----------------------------<  82  5.0   |  21  |  0.6[L]    Ca    8.5      09 Mar 2025 01:05  Phos  4.6     03-09  Mg     1.7     -09    TPro  5.7[L]  /  Alb  3.9  /  TBili  0.9  /  DBili  0.3  /  AST  42[H]  /  ALT  29  /  AlkPhos  82  03-09    PT/INR - ( 08 Mar 2025 15:27 )   PT: 9.90 sec;   INR: 0.84 ratio         PTT - ( 08 Mar 2025 15:27 )  PTT:31.7 sec  Urinalysis Basic - ( 09 Mar 2025 04:20 )    Color: Yellow / Appearance: Clear / S.030 / pH: x  Gluc: x / Ketone: 40 mg/dL  / Bili: Negative / Urobili: 0.2 mg/dL   Blood: x / Protein: Negative mg/dL / Nitrite: Negative   Leuk Esterase: Negative / RBC: x / WBC x   Sq Epi: x / Non Sq Epi: x / Bacteria: x        RADIOLOGY & ADDITIONAL STUDIES:

## 2025-03-09 NOTE — PROGRESS NOTE ADULT - ASSESSMENT
ASSESSMENT:  74yF w/ PMHx of anxiety and depression seen as a trauma consult s/p unwitnessed syncopal fall in her home this morning +HT, +LOC, -AC.  She lives alone in her home and her daughter was worried she wasn't answering her phone. Her daughter went over to her home to find her lying on the floor. Patient did not recall what happened, did not remember falling or feeling dizzy beforehand She is unsure how or where she fell. She complained of right hip pain and left rib pain. Daughter was able to get her up but then called EMS. Trauma assessment in ED: ABCs intact , GCS 15 , AAOx3.    PLAN:  - Q4hr neuro checks   - F/u syncope workup (echo , orthostatics)   - Ortho consult appreciated- non-operative management  - NPO  - Holding DVT ppx   - F/u UA   - Further care per SICU

## 2025-03-09 NOTE — PROGRESS NOTE ADULT - SUBJECTIVE AND OBJECTIVE BOX
NELSON EUBANKS   [id number]   [birth year]    74yF  ============================================================   DATE OF INITIAL SICU/SDU CONSULT: 03-08-25    INDICATION FOR SICU CONSULT:       SICU COURSE EVENTS :  03-08 - admitted to SICU service  3/9: Q4 neuro checks     24HOUR EVENTS  3/8  NIGHT  - A&Ox3, following commands  - head CT #2 done - stable, OK for Q4 neuro checks  - bladder scan > 800cc --> salcedo placed, 1L output  - f/u etoh level  - lactate down to 1.0  - CK downtrended  - 2g mag repleted    [X] A ten-point review of systems was negative except as expressed in note.  [X] History was obtained from patient. If unable to participate in their care, history obtained from review of the chart and collateral sources of information.  ============================================================  NELSON EUBANKS   [id number]   [birth year]    74yF  ============================================================   DATE OF INITIAL SICU/SDU CONSULT: 03-08-25    INDICATION FOR SICU CONSULT:  q1hr neuro checks      SICU COURSE EVENTS :  03-08 - admitted to SICU service  03-09 - Repeat CTH stable; liberalized to q4hr neuro checks.     24HOUR EVENTS  3/8  NIGHT  - A&Ox3, following commands  - head CT #2 done - stable, OK for Q4 neuro checks  - bladder scan > 800cc --> salcedo placed, 1L output  - f/u etoh level  - lactate down to 1.0  - CK downtrended  - 2g mag repleted    [X] A ten-point review of systems was negative except as expressed in note.  [X] History was obtained from patient. If unable to participate in their care, history obtained from review of the chart and collateral sources of information.  ============================================================     Daily     Daily   Diet, Regular (03-08-25 @ 23:57)    CURRENT MEDS:  Neurologic Medications  acetaminophen     Tablet .. 650 milliGRAM(s) Oral every 6 hours PRN Mild Pain (1 - 3)  ALPRAZolam 0.5 milliGRAM(s) Oral daily  escitalopram 20 milliGRAM(s) Oral daily  levETIRAcetam 500 milliGRAM(s) Oral every 12 hours  mirtazapine 7.5 milliGRAM(s) Oral daily  QUEtiapine 100 milliGRAM(s) Oral at bedtime    Respiratory Medications    Cardiovascular Medications    Gastrointestinal Medications  senna 2 Tablet(s) Oral at bedtime  sodium chloride 0.9%. 1000 milliLiter(s) IV Continuous <Continuous>    Genitourinary Medications    Hematologic/Oncologic Medications    Antimicrobial/Immunologic Medications    Endocrine/Metabolic Medications    Topical/Other Medications  chlorhexidine 2% Cloths 1 Application(s) Topical daily  lidocaine   4% Patch 1 Patch Transdermal daily PRN hip pain    ICU Vital Signs Last 24 Hrs  T(C): 37.1 (09 Mar 2025 06:00), Max: 37.1 (09 Mar 2025 06:00)  T(F): 98.7 (09 Mar 2025 06:00), Max: 98.7 (09 Mar 2025 06:00)  HR: 93 (09 Mar 2025 07:00) (74 - 103)  BP: 97/52 (09 Mar 2025 07:00) (97/52 - 124/77)  BP(mean): 71 (09 Mar 2025 07:00) (71 - 83)  ABP: --  ABP(mean): --  RR: 17 (09 Mar 2025 07:00) (16 - 19)  SpO2: 97% (09 Mar 2025 07:00) (91% - 99%)    O2 Parameters below as of 08 Mar 2025 21:04  Patient On (Oxygen Delivery Method): room air              I&O's Summary    08 Mar 2025 06:01  -  09 Mar 2025 07:00  --------------------------------------------------------  IN: 550 mL / OUT: 1700 mL / NET: -1150 mL      I&O's Detail    08 Mar 2025 06:01  -  09 Mar 2025 07:00  --------------------------------------------------------  IN:    IV PiggyBack: 50 mL    sodium chloride 0.9%: 500 mL  Total IN: 550 mL    OUT:    Indwelling Catheter - Urethral (mL): 1700 mL  Total OUT: 1700 mL    Total NET: -1150 mL          PHYSICAL EXAM:     NEURO/GENERAL: GCS 15. NAD. A&Ox4. no focal deficits   RESP: equal chest rise b/l, no signs of respiratory distress on RA.   CARDIAC: S1,S2. RRR on monitor.   GI: abdomen soft, nondistended, nontender.   : urinary catheter in place   EXTREMITIES: Tender to palpation at right hip, ecchymosis noted on right knee without pain. Moving all extremities; no peripheral edema.   SKIN: no DTI noted.

## 2025-03-09 NOTE — PROGRESS NOTE ADULT - SUBJECTIVE AND OBJECTIVE BOX
HD#2    SDH/SAH/IPH s/p fall    Pt seen and examined at bedside. Pt denies HA, dizziness or visual changes.     Vital Signs Last 24 Hrs  T(C): 37.8 (09 Mar 2025 12:00), Max: 37.8 (09 Mar 2025 12:00)  T(F): 100 (09 Mar 2025 12:00), Max: 100 (09 Mar 2025 12:00)  HR: 100 (09 Mar 2025 12:00) (84 - 111)  BP: 132/71 (09 Mar 2025 12:00) (97/52 - 152/52)  BP(mean): 91 (09 Mar 2025 12:) (70 - 91)  RR: 16 (09 Mar 2025 10:00) (16 - 19)  SpO2: 91% (09 Mar 2025 12:00) (91% - 99%)    Parameters below as of 09 Mar 2025 12:00  Patient On (Oxygen Delivery Method): room air        I&O's Detail    08 Mar 2025 06:01  -  09 Mar 2025 07:00  --------------------------------------------------------  IN:    IV PiggyBack: 50 mL    sodium chloride 0.9%: 500 mL  Total IN: 550 mL    OUT:    Indwelling Catheter - Urethral (mL): 1700 mL  Total OUT: 1700 mL    Total NET: -1150 mL      09 Mar 2025 07:01  -  09 Mar 2025 14:23  --------------------------------------------------------  IN:    Oral Fluid: 480 mL  Total IN: 480 mL    OUT:    Indwelling Catheter - Urethral (mL): 400 mL  Total OUT: 400 mL    Total NET: 80 mL        I&O's Summary    08 Mar 2025 06:01  -  09 Mar 2025 07:00  --------------------------------------------------------  IN: 550 mL / OUT: 1700 mL / NET: -1150 mL    09 Mar 2025 07:01  -  09 Mar 2025 14:23  --------------------------------------------------------  IN: 480 mL / OUT: 400 mL / NET: 80 mL        REVIEW OF SYSTEMS    [X] A ten-point review of systems was otherwise negative except as noted.  [ ] Due to altered mental status/intubation, subjective information were not able to be obtained from the patient. History was obtained, to the extent possible, from review of the chart and collateral sources of information.      PHYSICAL EXAM:  Neurological:  On PE:    A&Ox3 with clear speech  Pupils reactive  tracks  Left lid drag  No droop  tongue midline  No drift  Finger to nose intact  GERARDO - good strength  Follows complex commands        LABS:                        12.3   6.59  )-----------( 181      ( 09 Mar 2025 01:05 )             37.5     03-09    134[L]  |  96[L]  |  13  ----------------------------<  82  5.0   |  21  |  0.6[L]    Ca    8.5      09 Mar 2025 01:05  Phos  4.6     03-09  Mg     1.7     03-09    TPro  5.7[L]  /  Alb  3.9  /  TBili  0.9  /  DBili  0.3  /  AST  42[H]  /  ALT  29  /  AlkPhos  82  03-09    PT/INR - ( 08 Mar 2025 15:27 )   PT: 9.90 sec;   INR: 0.84 ratio         PTT - ( 08 Mar 2025 15:27 )  PTT:31.7 sec  Urinalysis Basic - ( 09 Mar 2025 04:20 )    Color: Yellow / Appearance: Clear / S.030 / pH: x  Gluc: x / Ketone: 40 mg/dL  / Bili: Negative / Urobili: 0.2 mg/dL   Blood: x / Protein: Negative mg/dL / Nitrite: Negative   Leuk Esterase: Negative / RBC: x / WBC x   Sq Epi: x / Non Sq Epi: x / Bacteria: x      Allergies    No Known Allergies    Intolerances      MEDICATIONS:  Antibiotics:    Neuro:  acetaminophen     Tablet .. 650 milliGRAM(s) Oral every 6 hours PRN  ALPRAZolam 0.5 milliGRAM(s) Oral daily  escitalopram 20 milliGRAM(s) Oral daily  levETIRAcetam 500 milliGRAM(s) Oral every 12 hours  mirtazapine 7.5 milliGRAM(s) Oral daily  QUEtiapine 100 milliGRAM(s) Oral at bedtime      ASSESSMENT:  74y Female s/p    Unspecified fall, initial encounter    Handoff    MEWS Score    Anxiety    Depression, major    Fall    FALL    90+    Subarachnoid hemorrhage    [email or web address]        PLAN:

## 2025-03-09 NOTE — PATIENT PROFILE ADULT - FALL HARM RISK - HARM RISK INTERVENTIONS

## 2025-03-10 LAB
ANION GAP SERPL CALC-SCNC: 11 MMOL/L — SIGNIFICANT CHANGE UP (ref 7–14)
ANION GAP SERPL CALC-SCNC: 9 MMOL/L — SIGNIFICANT CHANGE UP (ref 7–14)
BUN SERPL-MCNC: 12 MG/DL — SIGNIFICANT CHANGE UP (ref 10–20)
BUN SERPL-MCNC: 18 MG/DL — SIGNIFICANT CHANGE UP (ref 10–20)
CALCIUM SERPL-MCNC: 8.1 MG/DL — LOW (ref 8.4–10.5)
CALCIUM SERPL-MCNC: 8.6 MG/DL — SIGNIFICANT CHANGE UP (ref 8.4–10.5)
CHLORIDE SERPL-SCNC: 98 MMOL/L — SIGNIFICANT CHANGE UP (ref 98–110)
CHLORIDE SERPL-SCNC: 98 MMOL/L — SIGNIFICANT CHANGE UP (ref 98–110)
CO2 SERPL-SCNC: 25 MMOL/L — SIGNIFICANT CHANGE UP (ref 17–32)
CO2 SERPL-SCNC: 27 MMOL/L — SIGNIFICANT CHANGE UP (ref 17–32)
CREAT SERPL-MCNC: 0.6 MG/DL — LOW (ref 0.7–1.5)
CREAT SERPL-MCNC: 0.7 MG/DL — SIGNIFICANT CHANGE UP (ref 0.7–1.5)
EGFR: 91 ML/MIN/1.73M2 — SIGNIFICANT CHANGE UP
EGFR: 91 ML/MIN/1.73M2 — SIGNIFICANT CHANGE UP
EGFR: 94 ML/MIN/1.73M2 — SIGNIFICANT CHANGE UP
EGFR: 94 ML/MIN/1.73M2 — SIGNIFICANT CHANGE UP
GLUCOSE SERPL-MCNC: 116 MG/DL — HIGH (ref 70–99)
GLUCOSE SERPL-MCNC: 136 MG/DL — HIGH (ref 70–99)
HCT VFR BLD CALC: 37.1 % — SIGNIFICANT CHANGE UP (ref 37–47)
HGB BLD-MCNC: 12 G/DL — SIGNIFICANT CHANGE UP (ref 12–16)
MAGNESIUM SERPL-MCNC: 1.8 MG/DL — SIGNIFICANT CHANGE UP (ref 1.8–2.4)
MAGNESIUM SERPL-MCNC: 2 MG/DL — SIGNIFICANT CHANGE UP (ref 1.8–2.4)
MCHC RBC-ENTMCNC: 29.3 PG — SIGNIFICANT CHANGE UP (ref 27–31)
MCHC RBC-ENTMCNC: 32.3 G/DL — SIGNIFICANT CHANGE UP (ref 32–37)
MCV RBC AUTO: 90.7 FL — SIGNIFICANT CHANGE UP (ref 81–99)
MRSA PCR RESULT.: NEGATIVE — SIGNIFICANT CHANGE UP
NRBC BLD AUTO-RTO: 0 /100 WBCS — SIGNIFICANT CHANGE UP (ref 0–0)
PHOSPHATE SERPL-MCNC: 2.6 MG/DL — SIGNIFICANT CHANGE UP (ref 2.1–4.9)
PHOSPHATE SERPL-MCNC: 2.7 MG/DL — SIGNIFICANT CHANGE UP (ref 2.1–4.9)
PLATELET # BLD AUTO: 190 K/UL — SIGNIFICANT CHANGE UP (ref 130–400)
PMV BLD: 9.4 FL — SIGNIFICANT CHANGE UP (ref 7.4–10.4)
POTASSIUM SERPL-MCNC: 4.1 MMOL/L — SIGNIFICANT CHANGE UP (ref 3.5–5)
POTASSIUM SERPL-MCNC: 4.4 MMOL/L — SIGNIFICANT CHANGE UP (ref 3.5–5)
POTASSIUM SERPL-SCNC: 4.1 MMOL/L — SIGNIFICANT CHANGE UP (ref 3.5–5)
POTASSIUM SERPL-SCNC: 4.4 MMOL/L — SIGNIFICANT CHANGE UP (ref 3.5–5)
RBC # BLD: 4.09 M/UL — LOW (ref 4.2–5.4)
RBC # FLD: 14.9 % — HIGH (ref 11.5–14.5)
SODIUM SERPL-SCNC: 132 MMOL/L — LOW (ref 135–146)
SODIUM SERPL-SCNC: 136 MMOL/L — SIGNIFICANT CHANGE UP (ref 135–146)
WBC # BLD: 5.91 K/UL — SIGNIFICANT CHANGE UP (ref 4.8–10.8)
WBC # FLD AUTO: 5.91 K/UL — SIGNIFICANT CHANGE UP (ref 4.8–10.8)

## 2025-03-10 PROCEDURE — 99222 1ST HOSP IP/OBS MODERATE 55: CPT

## 2025-03-10 PROCEDURE — 99291 CRITICAL CARE FIRST HOUR: CPT

## 2025-03-10 RX ORDER — SODIUM PHOSPHATE,DIBASIC DIHYD
30 POWDER (GRAM) MISCELLANEOUS ONCE
Refills: 0 | Status: COMPLETED | OUTPATIENT
Start: 2025-03-10 | End: 2025-03-10

## 2025-03-10 RX ORDER — MIRTAZAPINE 30 MG/1
7.5 TABLET, FILM COATED ORAL AT BEDTIME
Refills: 0 | Status: DISCONTINUED | OUTPATIENT
Start: 2025-03-10 | End: 2025-03-11

## 2025-03-10 RX ORDER — SODIUM PHOSPHATE,DIBASIC DIHYD
15 POWDER (GRAM) MISCELLANEOUS ONCE
Refills: 0 | Status: COMPLETED | OUTPATIENT
Start: 2025-03-10 | End: 2025-03-10

## 2025-03-10 RX ORDER — MAGNESIUM SULFATE 500 MG/ML
2 SYRINGE (ML) INJECTION ONCE
Refills: 0 | Status: COMPLETED | OUTPATIENT
Start: 2025-03-10 | End: 2025-03-10

## 2025-03-10 RX ADMIN — Medication 1 APPLICATION(S): at 12:59

## 2025-03-10 RX ADMIN — QUETIAPINE FUMARATE 100 MILLIGRAM(S): 25 TABLET ORAL at 21:53

## 2025-03-10 RX ADMIN — Medication 85 MILLIMOLE(S): at 00:44

## 2025-03-10 RX ADMIN — Medication 25 GRAM(S): at 22:57

## 2025-03-10 RX ADMIN — Medication 0.5 MILLIGRAM(S): at 17:46

## 2025-03-10 RX ADMIN — ENOXAPARIN SODIUM 40 MILLIGRAM(S): 100 INJECTION SUBCUTANEOUS at 17:46

## 2025-03-10 RX ADMIN — LEVETIRACETAM 500 MILLIGRAM(S): 10 INJECTION, SOLUTION INTRAVENOUS at 17:46

## 2025-03-10 RX ADMIN — Medication 63.75 MILLIMOLE(S): at 23:29

## 2025-03-10 RX ADMIN — MIRTAZAPINE 7.5 MILLIGRAM(S): 30 TABLET, FILM COATED ORAL at 21:53

## 2025-03-10 RX ADMIN — LEVETIRACETAM 500 MILLIGRAM(S): 10 INJECTION, SOLUTION INTRAVENOUS at 05:30

## 2025-03-10 RX ADMIN — ESCITALOPRAM OXALATE 20 MILLIGRAM(S): 20 TABLET ORAL at 12:54

## 2025-03-10 RX ADMIN — Medication 0.5 MILLIGRAM(S): at 05:30

## 2025-03-10 NOTE — PHYSICAL THERAPY INITIAL EVALUATION ADULT - GENERAL OBSERVATIONS, REHAB EVAL
[id number] pm. 75 y/o F rec'd/left in b/s chair, nad, + tele. pt is A+Ox4, reporting 5/10 RT hip pain with mobility. vitals: 120/67 98 99% on RA.

## 2025-03-10 NOTE — PROGRESS NOTE ADULT - SUBJECTIVE AND OBJECTIVE BOX
GENERAL SURGERY PROGRESS NOTE    Patient: NELSON EUBANKS , 74y ([birth year])Female   MRN: [id number]  Location: [place] [address] A  Visit: 03-08-25 Inpatient  Date: 03-10-25 @ 03:11    Events of past 24 hours: patient seen and examined at bedside. DG to SDU, per NSGY ok for q4h neuro checks. and DVT ppx. Dickson inserted for urinary retention    PAST MEDICAL & SURGICAL HISTORY:  Anxiety      Depression, major    Vitals:   T(F): 98.6 (03-10-25 @ 00:00), Max: 100 (03-09-25 @ 12:00)  HR: 86 (03-10-25 @ 00:00)  BP: 111/62 (03-10-25 @ 00:00)  RR: 17 (03-09-25 @ 20:00)  SpO2: 97% (03-10-25 @ 00:00)      Diet, Regular      Fluids:     I & O's:    03-08-25 @ 06:01  -  03-09-25 @ 07:00  --------------------------------------------------------  IN:    IV PiggyBack: 50 mL    sodium chloride 0.9%: 500 mL  Total IN: 550 mL    OUT:    Indwelling Catheter - Urethral (mL): 1700 mL  Total OUT: 1700 mL    Total NET: -1150 mL    PHYSICAL EXAM:  General: NAD, awake and alert, oriented x3  HEENT: Normocephalic, right frontal scalp hematoma.   Respiratory: Normal work of breathing on RA  Abdomen: Soft, non-distended, non-tender, no rebound, no guarding.  Groin: Normal appearing, pelvis stable   Ext:  Moving b/l upper and lower extremities, sensation grossly intact    MEDICATIONS  (STANDING):  ALPRAZolam 0.5 milliGRAM(s) Oral every 12 hours  chlorhexidine 2% Cloths 1 Application(s) Topical daily  enoxaparin Injectable 40 milliGRAM(s) SubCutaneous every 24 hours  escitalopram 20 milliGRAM(s) Oral daily  levETIRAcetam 500 milliGRAM(s) Oral every 12 hours  mirtazapine 7.5 milliGRAM(s) Oral daily  QUEtiapine 100 milliGRAM(s) Oral at bedtime  senna 2 Tablet(s) Oral at bedtime    MEDICATIONS  (PRN):  acetaminophen     Tablet .. 650 milliGRAM(s) Oral every 6 hours PRN Mild Pain (1 - 3)  lidocaine   4% Patch 1 Patch Transdermal daily PRN hip pain      DVT PROPHYLAXIS: enoxaparin Injectable 40 milliGRAM(s) SubCutaneous every 24 hours    LAB/STUDIES:  Labs:  CAPILLARY BLOOD GLUCOSE                        12.0   5.29  )-----------( 178      ( 09 Mar 2025 20:00 )             35.1         03-09    132[L]  |  98  |  18  ----------------------------<  136[H]  4.1   |  25  |  0.6[L]      Calcium: 8.1 mg/dL (03-09-25 @ 20:00)      LFTs:             5.7  | 0.9  | 42       ------------------[82      ( 09 Mar 2025 01:05 )  3.9  | 0.3  | 29             Lactate, Blood: 1.0 mmol/L (03-09-25 @ 01:05)  Lactate, Blood: 3.1 mmol/L (03-08-25 @ 15:27)      Coags:     9.90   ----< 0.84    ( 08 Mar 2025 15:27 )     31.7

## 2025-03-10 NOTE — CHART NOTE - NSCHARTNOTEFT_GEN_A_CORE
SICU Transfer Note    NELSON EUBANKS  74y (1950)  [id number]      Transfer from: SICU  Transfer to: Surgery- 4C      SICU COURSE EVENTS :  03-08 - admitted to SICU service  03-09 - Repeat CTH stable; liberalized to q4hr neuro checks. TTE performed.   03-10: Salcedo removed. DG to 4C.       PAST MEDICAL & SURGICAL HISTORY:  Anxiety  Depression, major        Allergies  No Known Allergies  Intolerances      MEDICATIONS  (STANDING):  ALPRAZolam 0.5 milliGRAM(s) Oral every 12 hours  chlorhexidine 2% Cloths 1 Application(s) Topical daily  enoxaparin Injectable 40 milliGRAM(s) SubCutaneous every 24 hours  escitalopram 20 milliGRAM(s) Oral daily  levETIRAcetam 500 milliGRAM(s) Oral every 12 hours  mirtazapine 7.5 milliGRAM(s) Oral daily  QUEtiapine 100 milliGRAM(s) Oral at bedtime  senna 2 Tablet(s) Oral at bedtime    MEDICATIONS  (PRN):  acetaminophen     Tablet .. 650 milliGRAM(s) Oral every 6 hours PRN Mild Pain (1 - 3)  lidocaine   4% Patch 1 Patch Transdermal daily PRN hip pain      Vital Signs Last 24 Hrs  T(C): 36.5 (10 Mar 2025 07:55), Max: 37.8 (09 Mar 2025 12:00)  T(F): 97.7 (10 Mar 2025 07:55), Max: 100 (09 Mar 2025 12:00)  HR: 90 (10 Mar 2025 11:00) (82 - 105)  BP: 118/59 (10 Mar 2025 10:00) (99/54 - 133/71)  BP(mean): 83 (10 Mar 2025 10:00) (69 - 97)  RR: 17 (10 Mar 2025 10:00) (16 - 18)  SpO2: 98% (10 Mar 2025 11:00) (91% - 98%)    Parameters below as of 10 Mar 2025 08:00  Patient On (Oxygen Delivery Method): room air      I&O's Summary    09 Mar 2025 07:01  -  10 Mar 2025 07:00  --------------------------------------------------------  IN: 920 mL / OUT: 1750 mL / NET: -830 mL    10 Mar 2025 07:01  -  10 Mar 2025 11:05  --------------------------------------------------------  IN: 0 mL / OUT: 325 mL / NET: -325 mL        LABS  LABS:                        12.0   5.29  )-----------( 178      ( 09 Mar 2025 20:00 )             35.1       03-09    132[L]  |  98  |  18  ----------------------------<  136[H]  4.1   |  25  |  0.6[L]    Ca    8.1[L]      09 Mar 2025 20:00  Phos  2.6     03-09  Mg     2.0     03-09    TPro  5.7[L]  /  Alb  3.9  /  TBili  0.9  /  DBili  0.3  /  AST  42[H]  /  ALT  29  /  AlkPhos  82  03-09      PT/INR - ( 08 Mar 2025 15:27 )   PT: 9.90 sec;   INR: 0.84 ratio    PTT - ( 08 Mar 2025 15:27 )  PTT:31.7 sec                            Assessment & Plan:  74F s/p fall with acute on chronic SDH, acute SAH/IPH, R greater trochanteric femur fx.     NEURO/PSYCH:  #acute on chronic SDH, acute SAH/IPH  - Neurosurgery - no surgical intervention, q4hr neuro checks, keppra 500bid x 7days   - neuroendovascular consult pending for possible MMA embolization  - Head CT #2: stable  - q4h neuro checks  #Acute pain  - APAP prn  - lidocaine patch  #PMHx of anxiety/depression  - continue home escitalopram 20mg QD  - continue home mirtazapine 7.5mg QD   - continue home xanax 0.5mg BID  - continue home QUEtiapine 100mg QHS     RESP:   #Oxygenation  - tolerating RA  - encourage incentive spirometry  #Activity  - increase as tolerated    CARDIAC:   #syncopal fall  - no cardiac PMHx  - TTE performed 3/9 - EF 58%, mild MR, mild TR, sclerotic aortic valve  - orthostatics pending when able to ambulate  - EKG sinus rhythm    GI/NUTR:   #Diet, Regular  - aspiration precautions, HOB 30  #GI Prophylaxis  - not indicated  #Bowel regimen  - senna  - last BM 3/9    /RENAL:   #elevated lactate  - Lactate 3.1 on admission, now 1.0  - no longer trending   #retention  - salcedo placed on admission for retention  - remove salcedo today; TOV pending  Labs:          BUN/Cr -  13/0.6  -->,  18/0.6  -->          [03-09 @ 20:00]Na  132 // K  4.1 // Mg  2.0 // Phos  2.6      HEME/ONC:   #DVT prophylaxis  - mechanical: SCDs  - Chemical: Lovenox 40 QD    Labs: Hgb/Hct:  12.3/37.5  (03-09 @ 01:05)  -->,  12.0/35.1  (03-09 @ 20:00)  -->                      Platelets:  181  -->,  178  -->                 PTT/INR:        ID:  #monitor for leukocytosis/fever  - UA negative   #MRSA pending  WBC -  11.70  --->>,  6.59  --->>,  5.29  --->>  Temp trend - 24hrs T(F): 98.4 (03-09 @ 20:00), Max: 100 (03-09 @ 12:00)    ENDO:  #glycemic monitoring  - Glucose goal 140-180. if above 180, start insulin sliding scale    MSK:  #acute right comminuted femoral greater trochanteric fracture  - ortho consulted > non-operative management, elevation and icing  - RLE WBAT  - PT pending     DERM:  #DTI screen negative   - continue to monitor daily skin changes           Follow Up:  - 2000 labs  - f/u neuroIR for possible MMA embolization  - f/u PT recommendation > WBAT RLE  - f/u TOV, salcedo removed   - pending orthostatics when able to ambulate     Signed out to:  Date: 3/10/25  Time: SICU Transfer Note    NELSON EUBANKS  74y (1950)  [id number]    Transfer from: SICU  Transfer to: Surgery- 4C      SICU COURSE EVENTS :  03-08 - admitted to SICU service  03-09 - Repeat CTH stable; liberalized to q4hr neuro checks. TTE performed.   03-10: Salcedo removed. DG to 4C.       PAST MEDICAL & SURGICAL HISTORY:  Anxiety  Depression, major        Allergies  No Known Allergies  Intolerances      MEDICATIONS  (STANDING):  ALPRAZolam 0.5 milliGRAM(s) Oral every 12 hours  chlorhexidine 2% Cloths 1 Application(s) Topical daily  enoxaparin Injectable 40 milliGRAM(s) SubCutaneous every 24 hours  escitalopram 20 milliGRAM(s) Oral daily  levETIRAcetam 500 milliGRAM(s) Oral every 12 hours  mirtazapine 7.5 milliGRAM(s) Oral daily  QUEtiapine 100 milliGRAM(s) Oral at bedtime  senna 2 Tablet(s) Oral at bedtime    MEDICATIONS  (PRN):  acetaminophen     Tablet .. 650 milliGRAM(s) Oral every 6 hours PRN Mild Pain (1 - 3)  lidocaine   4% Patch 1 Patch Transdermal daily PRN hip pain      Vital Signs Last 24 Hrs  T(C): 36.5 (10 Mar 2025 07:55), Max: 37.8 (09 Mar 2025 12:00)  T(F): 97.7 (10 Mar 2025 07:55), Max: 100 (09 Mar 2025 12:00)  HR: 90 (10 Mar 2025 11:00) (82 - 105)  BP: 118/59 (10 Mar 2025 10:00) (99/54 - 133/71)  BP(mean): 83 (10 Mar 2025 10:00) (69 - 97)  RR: 17 (10 Mar 2025 10:00) (16 - 18)  SpO2: 98% (10 Mar 2025 11:00) (91% - 98%)    Parameters below as of 10 Mar 2025 08:00  Patient On (Oxygen Delivery Method): room air      I&O's Summary    09 Mar 2025 07:01  -  10 Mar 2025 07:00  --------------------------------------------------------  IN: 920 mL / OUT: 1750 mL / NET: -830 mL    10 Mar 2025 07:01  -  10 Mar 2025 11:05  --------------------------------------------------------  IN: 0 mL / OUT: 325 mL / NET: -325 mL        LABS  LABS:                        12.0   5.29  )-----------( 178      ( 09 Mar 2025 20:00 )             35.1       03-09    132[L]  |  98  |  18  ----------------------------<  136[H]  4.1   |  25  |  0.6[L]    Ca    8.1[L]      09 Mar 2025 20:00  Phos  2.6     03-09  Mg     2.0     03-09    TPro  5.7[L]  /  Alb  3.9  /  TBili  0.9  /  DBili  0.3  /  AST  42[H]  /  ALT  29  /  AlkPhos  82  03-09      PT/INR - ( 08 Mar 2025 15:27 )   PT: 9.90 sec;   INR: 0.84 ratio    PTT - ( 08 Mar 2025 15:27 )  PTT:31.7 sec                            Assessment & Plan:  74F s/p fall with acute on chronic SDH, acute SAH/IPH, R greater trochanteric femur fx.     NEURO/PSYCH:  #acute on chronic SDH, acute SAH/IPH  - Neurosurgery - no surgical intervention, q4hr neuro checks, keppra 500bid x 7days   - neuroendovascular consult pending for possible MMA embolization  - Head CT #2: stable  - q4h neuro checks  #Acute pain  - APAP prn  - lidocaine patch  #PMHx of anxiety/depression  - continue home escitalopram 20mg QD  - continue home mirtazapine 7.5mg QD   - continue home xanax 0.5mg BID  - continue home QUEtiapine 100mg QHS     RESP:   #Oxygenation  - tolerating RA  - encourage incentive spirometry  #Activity  - increase as tolerated    CARDIAC:   #syncopal fall  - no cardiac PMHx  - TTE performed 3/9 - EF 58%, mild MR, mild TR, sclerotic aortic valve  - orthostatics pending when able to ambulate  - EKG sinus rhythm    GI/NUTR:   #Diet, Regular  - aspiration precautions, HOB 30  #GI Prophylaxis  - not indicated  #Bowel regimen  - senna  - last BM 3/9    /RENAL:   #elevated lactate  - Lactate 3.1 on admission, now 1.0  - no longer trending   #retention  - salcedo placed on admission for retention  - remove salcedo today; TOV pending  Labs:          BUN/Cr -  13/0.6  -->,  18/0.6  -->          [03-09 @ 20:00]Na  132 // K  4.1 // Mg  2.0 // Phos  2.6      HEME/ONC:   #DVT prophylaxis  - mechanical: SCDs  - Chemical: Lovenox 40 QD    Labs: Hgb/Hct:  12.3/37.5  (03-09 @ 01:05)  -->,  12.0/35.1  (03-09 @ 20:00)  -->                      Platelets:  181  -->,  178  -->                 PTT/INR:        ID:  #monitor for leukocytosis/fever  - UA negative   #MRSA pending  WBC -  11.70  --->>,  6.59  --->>,  5.29  --->>  Temp trend - 24hrs T(F): 98.4 (03-09 @ 20:00), Max: 100 (03-09 @ 12:00)    ENDO:  #glycemic monitoring  - Glucose goal 140-180. if above 180, start insulin sliding scale    MSK:  #acute right comminuted femoral greater trochanteric fracture  - ortho consulted > non-operative management, elevation and icing  - RLE WBAT  - PT pending     DERM:  #DTI screen negative   - continue to monitor daily skin changes           Follow Up:  - 2000 labs  - f/u neuroIR for possible MMA embolization  - f/u PT recommendation > WBAT RLE  - f/u TOV, salcedo removed   - pending orthostatics when able to ambulate     Signed out to:  Date: 3/10/25  Time: SICU Transfer Note    NELSON EUBANKS  74y (1950)  [id number]    Transfer from: SICU  Transfer to: Surgery- 4C      SICU COURSE EVENTS :  03-08 - admitted to SICU service  03-09 - Repeat CTH stable; liberalized to q4hr neuro checks. TTE performed.   03-10: Salcedo removed. DG to 4C.       PAST MEDICAL & SURGICAL HISTORY:  Anxiety  Depression, major        Allergies  No Known Allergies  Intolerances      MEDICATIONS  (STANDING):  ALPRAZolam 0.5 milliGRAM(s) Oral every 12 hours  chlorhexidine 2% Cloths 1 Application(s) Topical daily  enoxaparin Injectable 40 milliGRAM(s) SubCutaneous every 24 hours  escitalopram 20 milliGRAM(s) Oral daily  levETIRAcetam 500 milliGRAM(s) Oral every 12 hours  mirtazapine 7.5 milliGRAM(s) Oral daily  QUEtiapine 100 milliGRAM(s) Oral at bedtime  senna 2 Tablet(s) Oral at bedtime    MEDICATIONS  (PRN):  acetaminophen     Tablet .. 650 milliGRAM(s) Oral every 6 hours PRN Mild Pain (1 - 3)  lidocaine   4% Patch 1 Patch Transdermal daily PRN hip pain      Vital Signs Last 24 Hrs  T(C): 36.5 (10 Mar 2025 07:55), Max: 37.8 (09 Mar 2025 12:00)  T(F): 97.7 (10 Mar 2025 07:55), Max: 100 (09 Mar 2025 12:00)  HR: 90 (10 Mar 2025 11:00) (82 - 105)  BP: 118/59 (10 Mar 2025 10:00) (99/54 - 133/71)  BP(mean): 83 (10 Mar 2025 10:00) (69 - 97)  RR: 17 (10 Mar 2025 10:00) (16 - 18)  SpO2: 98% (10 Mar 2025 11:00) (91% - 98%)    Parameters below as of 10 Mar 2025 08:00  Patient On (Oxygen Delivery Method): room air      I&O's Summary    09 Mar 2025 07:01  -  10 Mar 2025 07:00  --------------------------------------------------------  IN: 920 mL / OUT: 1750 mL / NET: -830 mL    10 Mar 2025 07:01  -  10 Mar 2025 11:05  --------------------------------------------------------  IN: 0 mL / OUT: 325 mL / NET: -325 mL        LABS  LABS:                        12.0   5.29  )-----------( 178      ( 09 Mar 2025 20:00 )             35.1       03-09    132[L]  |  98  |  18  ----------------------------<  136[H]  4.1   |  25  |  0.6[L]    Ca    8.1[L]      09 Mar 2025 20:00  Phos  2.6     03-09  Mg     2.0     03-09    TPro  5.7[L]  /  Alb  3.9  /  TBili  0.9  /  DBili  0.3  /  AST  42[H]  /  ALT  29  /  AlkPhos  82  03-09      PT/INR - ( 08 Mar 2025 15:27 )   PT: 9.90 sec;   INR: 0.84 ratio    PTT - ( 08 Mar 2025 15:27 )  PTT:31.7 sec                            Assessment & Plan:  74F s/p fall with acute on chronic SDH, acute SAH/IPH, R greater trochanteric femur fx.     NEURO/PSYCH:  #acute on chronic SDH, acute SAH/IPH  - Neurosurgery - no surgical intervention, q4hr neuro checks, keppra 500bid x 7days   - neuroendovascular consult pending for possible MMA embolization  - Head CT #2: stable  - q4h neuro checks  #Acute pain  - APAP prn  - lidocaine patch  #PMHx of anxiety/depression  - continue home escitalopram 20mg QD  - continue home mirtazapine 7.5mg QD   - continue home xanax 0.5mg BID  - continue home QUEtiapine 100mg QHS     RESP:   #Oxygenation  - tolerating RA  - encourage incentive spirometry  #Activity  - increase as tolerated    CARDIAC:   #syncopal fall  - no cardiac PMHx  - TTE performed 3/9 - EF 58%, mild MR, mild TR, sclerotic aortic valve  - orthostatics pending when able to ambulate  - EKG sinus rhythm    GI/NUTR:   #Diet, Regular  - aspiration precautions, HOB 30  #GI Prophylaxis  - not indicated  #Bowel regimen  - senna  - last BM 3/9    /RENAL:   #elevated lactate  - Lactate 3.1 on admission, now 1.0  - no longer trending   #retention  - salcedo placed on admission for retention  - remove salcedo today; TOV pending  Labs:          BUN/Cr -  13/0.6  -->,  18/0.6  -->          [03-09 @ 20:00]Na  132 // K  4.1 // Mg  2.0 // Phos  2.6      HEME/ONC:   #DVT prophylaxis  - mechanical: SCDs  - Chemical: Lovenox 40 QD    Labs: Hgb/Hct:  12.3/37.5  (03-09 @ 01:05)  -->,  12.0/35.1  (03-09 @ 20:00)  -->                      Platelets:  181  -->,  178  -->                 PTT/INR:        ID:  #monitor for leukocytosis/fever  - UA negative   #MRSA pending  WBC -  11.70  --->>,  6.59  --->>,  5.29  --->>  Temp trend - 24hrs T(F): 98.4 (03-09 @ 20:00), Max: 100 (03-09 @ 12:00)    ENDO:  #glycemic monitoring  - Glucose goal 140-180. if above 180, start insulin sliding scale    MSK:  #acute right comminuted femoral greater trochanteric fracture  - ortho consulted > non-operative management, elevation and icing  - RLE WBAT  - PT pending     DERM:  #DTI screen negative   - continue to monitor daily skin changes         Follow Up:  - 2000 labs  - f/u neuroIR for possible MMA embolization  - f/u PT recommendation > WBAT RLE  - f/u TOV, salcedo removed   - pending orthostatics when able to ambulate     Signed out to: Carri Tapia Resident   Date: 3/10/25  Time: 1155AM

## 2025-03-10 NOTE — PROGRESS NOTE ADULT - ASSESSMENT
74yF w/ PMHx of anxiety and depression seen as a trauma consult s/p unwitnessed syncopal fall in her home this morning +HT, +LOC, -AC.  She lives alone in her home and her daughter was worried she wasn't answering her phone. Her daughter went over to her home to find her lying on the floor. Patient did not recall what happened, did not remember falling or feeling dizzy beforehand She is unsure how or where she fell. She complained of right hip pain and left rib pain. Daughter was able to get her up but then called EMS. Trauma assessment in ED: ABCs intact , GCS 15 , AAOx3.    PLAN:  - Q4hr neuro checks   - syncope workup: orthostatics negative, echo w/ 58% EF, mild MR, TR   - Ortho consult appreciated- non-operative management  - NPO  - ok for DVT ppx   - F/u UA   - Further care per SICU

## 2025-03-10 NOTE — PROGRESS NOTE ADULT - SUBJECTIVE AND OBJECTIVE BOX
NELSON EUBANKS   [id number]   [birth year]    74yF  ============================================================   DATE OF INITIAL SICU/SDU CONSULT: 03-08-25    INDICATION FOR SICU CONSULT:       SICU COURSE EVENTS :  03-08 - admitted to SICU service  3/9: Q4 neuro checks     24HOUR EVENTS  3/9  NIGHT  -PM rounds: , NSR HR 90s, saturating 96% RA.   -no new complaints or neurological symptoms; endorses right leg soreness   -A&Ox3, CN II-XII intact, 5/5 in upper extremities, 5/5 in lower extremities, full sensation to light touch, (-) Pronator drift   - Lungs CTA b/l; S1/S2; intact DP pulses; right leg non tender, compartments soft   - Pt states xanax is BID, changed  - 30 Naphos    Day  - f/u neuroIR  - hospitalist consult  - neurosx > q4hr neuro checks, ok for DVT ppx  - dg SDU    [X] A ten-point review of systems was negative except as expressed in note.  [X] History was obtained from patient. If unable to participate in their care, history obtained from review of the chart and collateral sources of information.  ============================================================  NELSON EUBANKS   [id number]   [birth year]    74yF  ============================================================   DATE OF INITIAL SICU/SDU CONSULT: 03-08-25    INDICATION FOR SICU CONSULT:  q1hr neuro checks      SICU COURSE EVENTS :  03-08 - admitted to SICU service  03-09 - Repeat CTH stable; liberalized to q4hr neuro checks. TTE performed.      24HOUR EVENTS  3/9  NIGHT  -PM rounds: , NSR HR 90s, saturating 96% RA.   -no new complaints or neurological symptoms; endorses right leg soreness   -A&Ox3, CN II-XII intact, 5/5 in upper extremities, 5/5 in lower extremities, full sensation to light touch, (-) Pronator drift   - Lungs CTA b/l; S1/S2; intact DP pulses; right leg non tender, compartments soft   - Pt states xanax is BID, changed  - 30 Naphos  - TTE 3/9: EF of 58 %. Mild MR/TR    Day  - f/u neuroIR  - hospitalist consult  - neurosx > q4hr neuro checks, ok for DVT ppx  - dg SDU    [X] A ten-point review of systems was negative except as expressed in note.  [X] History was obtained from patient. If unable to participate in their care, history obtained from review of the chart and collateral sources of information.  ============================================================      Daily     Daily   Diet, Regular (03-08-25 @ 23:57)    CURRENT MEDS:  Neurologic Medications  acetaminophen     Tablet .. 650 milliGRAM(s) Oral every 6 hours PRN Mild Pain (1 - 3)  ALPRAZolam 0.5 milliGRAM(s) Oral every 12 hours  escitalopram 20 milliGRAM(s) Oral daily  levETIRAcetam 500 milliGRAM(s) Oral every 12 hours  mirtazapine 7.5 milliGRAM(s) Oral daily  QUEtiapine 100 milliGRAM(s) Oral at bedtime    Respiratory Medications    Cardiovascular Medications    Gastrointestinal Medications  senna 2 Tablet(s) Oral at bedtime    Genitourinary Medications    Hematologic/Oncologic Medications  enoxaparin Injectable 40 milliGRAM(s) SubCutaneous every 24 hours    Antimicrobial/Immunologic Medications    Endocrine/Metabolic Medications    Topical/Other Medications  chlorhexidine 2% Cloths 1 Application(s) Topical daily  lidocaine   4% Patch 1 Patch Transdermal daily PRN hip pain    ICU Vital Signs Last 24 Hrs  T(C): 37 (10 Mar 2025 00:00), Max: 37.8 (09 Mar 2025 12:00)  T(F): 98.6 (10 Mar 2025 00:00), Max: 100 (09 Mar 2025 12:00)  HR: 86 (10 Mar 2025 00:00) (86 - 111)  BP: 111/62 (10 Mar 2025 00:00) (107/62 - 152/52)  BP(mean): 81 (10 Mar 2025 00:00) (70 - 91)  ABP: --  ABP(mean): --  RR: 17 (09 Mar 2025 20:00) (16 - 17)  SpO2: 97% (10 Mar 2025 00:00) (91% - 98%)    O2 Parameters below as of 09 Mar 2025 16:00  Patient On (Oxygen Delivery Method): room air              I&O's Summary    09 Mar 2025 07:01  -  10 Mar 2025 07:00  --------------------------------------------------------  IN: 600 mL / OUT: 1450 mL / NET: -850 mL      I&O's Detail    09 Mar 2025 07:01  -  10 Mar 2025 07:00  --------------------------------------------------------  IN:    Oral Fluid: 600 mL  Total IN: 600 mL    OUT:    Indwelling Catheter - Urethral (mL): 1450 mL  Total OUT: 1450 mL    Total NET: -850 mL          PHYSICAL EXAM:   NEURO/GENERAL: GCS 15. NAD. A&Ox4. no focal deficits   RESP: equal chest rise b/l, no signs of respiratory distress on RA.   CARDIAC: S1,S2. RRR on monitor.   GI: abdomen soft, nondistended, nontender.   : urinary catheter in place   EXTREMITIES: Tender to palpation at right hip, ecchymosis noted on right knee without pain. Moving all extremities.  SKIN: no DTI noted.        NELSON EUBANKS   [id number]   [birth year]    74yF  ============================================================   DATE OF INITIAL SICU/SDU CONSULT: 03-08-25    INDICATION FOR SICU CONSULT:  q1hr neuro checks      SICU COURSE EVENTS :  03-08 - admitted to SICU service  03-09 - Repeat CTH stable; liberalized to q4hr neuro checks. TTE performed.      24HOUR EVENTS  3/9  NIGHT  -PM rounds: , NSR HR 90s, saturating 96% RA.   -no new complaints or neurological symptoms; endorses right leg soreness   -A&Ox3, CN II-XII intact, 5/5 in upper extremities, 5/5 in lower extremities, full sensation to light touch, (-) Pronator drift   - Lungs CTA b/l; S1/S2; intact DP pulses; right leg non tender, compartments soft   - Pt states xanax is BID, changed  - 30 Naphos  - TTE 3/9: EF of 58 %. Mild MR/TR    Day  - f/u neuroIR  - hospitalist consult  - neurosx > q4hr neuro checks, ok for DVT ppx  - dg SDU    [X] A ten-point review of systems was negative except as expressed in note.  [X] History was obtained from patient. If unable to participate in their care, history obtained from review of the chart and collateral sources of information.  ============================================================      Daily     Daily   Diet, Regular (03-08-25 @ 23:57)    CURRENT MEDS:  Neurologic Medications  acetaminophen     Tablet .. 650 milliGRAM(s) Oral every 6 hours PRN Mild Pain (1 - 3)  ALPRAZolam 0.5 milliGRAM(s) Oral every 12 hours  escitalopram 20 milliGRAM(s) Oral daily  levETIRAcetam 500 milliGRAM(s) Oral every 12 hours  mirtazapine 7.5 milliGRAM(s) Oral daily  QUEtiapine 100 milliGRAM(s) Oral at bedtime    Respiratory Medications    Cardiovascular Medications    Gastrointestinal Medications  senna 2 Tablet(s) Oral at bedtime    Genitourinary Medications    Hematologic/Oncologic Medications  enoxaparin Injectable 40 milliGRAM(s) SubCutaneous every 24 hours    Antimicrobial/Immunologic Medications    Endocrine/Metabolic Medications    Topical/Other Medications  chlorhexidine 2% Cloths 1 Application(s) Topical daily  lidocaine   4% Patch 1 Patch Transdermal daily PRN hip pain    ICU Vital Signs Last 24 Hrs  T(C): 37 (10 Mar 2025 00:00), Max: 37.8 (09 Mar 2025 12:00)  T(F): 98.6 (10 Mar 2025 00:00), Max: 100 (09 Mar 2025 12:00)  HR: 86 (10 Mar 2025 00:00) (86 - 111)  BP: 111/62 (10 Mar 2025 00:00) (107/62 - 152/52)  BP(mean): 81 (10 Mar 2025 00:00) (70 - 91)  ABP: --  ABP(mean): --  RR: 17 (09 Mar 2025 20:00) (16 - 17)  SpO2: 97% (10 Mar 2025 00:00) (91% - 98%)    O2 Parameters below as of 09 Mar 2025 16:00  Patient On (Oxygen Delivery Method): room air              I&O's Summary    09 Mar 2025 07:01  -  10 Mar 2025 07:00  --------------------------------------------------------  IN: 600 mL / OUT: 1450 mL / NET: -850 mL      I&O's Detail    09 Mar 2025 07:01  -  10 Mar 2025 07:00  --------------------------------------------------------  IN:    Oral Fluid: 600 mL  Total IN: 600 mL    OUT:    Indwelling Catheter - Urethral (mL): 1450 mL  Total OUT: 1450 mL    Total NET: -850 mL          PHYSICAL EXAM:   NEURO/GENERAL: GCS 15. NAD. A&Ox4. no focal deficits   RESP: equal chest rise b/l, no signs of respiratory distress on RA.   CARDIAC: S1,S2. RRR on monitor.   GI: abdomen soft, nondistended, nontender.   : urinary catheter in place   EXTREMITIES: Tender to palpation at right hip. Moving all extremities.  SKIN: no DTI noted.        NELSON EUBANKS   [id number]   [birth year]    74yF  ============================================================   DATE OF INITIAL SICU/SDU CONSULT: 03-08-25    INDICATION FOR SICU CONSULT:  q1hr neuro checks      SICU COURSE EVENTS :  03-08 - admitted to SICU service  03-09 - Repeat CTH stable; liberalized to q4hr neuro checks. TTE performed.   03-10: Dickson removed. DG to 4C.      24HOUR EVENTS  3/9  NIGHT  -PM rounds: , NSR HR 90s, saturating 96% RA.   -no new complaints or neurological symptoms; endorses right leg soreness   -A&Ox3, CN II-XII intact, 5/5 in upper extremities, 5/5 in lower extremities, full sensation to light touch, (-) Pronator drift   - Lungs CTA b/l; S1/S2; intact DP pulses; right leg non tender, compartments soft   - Pt states xanax is BID, changed  - 30 Naphos  - TTE 3/9: EF of 58 %. Mild MR/TR    Day  - f/u neuroIR  - hospitalist consult  - neurosx > q4hr neuro checks, ok for DVT ppx  - dg SDU    [X] A ten-point review of systems was negative except as expressed in note.  [X] History was obtained from patient. If unable to participate in their care, history obtained from review of the chart and collateral sources of information.  ============================================================      Daily     Daily   Diet, Regular (03-08-25 @ 23:57)    CURRENT MEDS:  Neurologic Medications  acetaminophen     Tablet .. 650 milliGRAM(s) Oral every 6 hours PRN Mild Pain (1 - 3)  ALPRAZolam 0.5 milliGRAM(s) Oral every 12 hours  escitalopram 20 milliGRAM(s) Oral daily  levETIRAcetam 500 milliGRAM(s) Oral every 12 hours  mirtazapine 7.5 milliGRAM(s) Oral daily  QUEtiapine 100 milliGRAM(s) Oral at bedtime    Respiratory Medications    Cardiovascular Medications    Gastrointestinal Medications  senna 2 Tablet(s) Oral at bedtime    Genitourinary Medications    Hematologic/Oncologic Medications  enoxaparin Injectable 40 milliGRAM(s) SubCutaneous every 24 hours    Antimicrobial/Immunologic Medications    Endocrine/Metabolic Medications    Topical/Other Medications  chlorhexidine 2% Cloths 1 Application(s) Topical daily  lidocaine   4% Patch 1 Patch Transdermal daily PRN hip pain    ICU Vital Signs Last 24 Hrs  T(C): 37 (10 Mar 2025 00:00), Max: 37.8 (09 Mar 2025 12:00)  T(F): 98.6 (10 Mar 2025 00:00), Max: 100 (09 Mar 2025 12:00)  HR: 86 (10 Mar 2025 00:00) (86 - 111)  BP: 111/62 (10 Mar 2025 00:00) (107/62 - 152/52)  BP(mean): 81 (10 Mar 2025 00:00) (70 - 91)  ABP: --  ABP(mean): --  RR: 17 (09 Mar 2025 20:00) (16 - 17)  SpO2: 97% (10 Mar 2025 00:00) (91% - 98%)    O2 Parameters below as of 09 Mar 2025 16:00  Patient On (Oxygen Delivery Method): room air              I&O's Summary    09 Mar 2025 07:01  -  10 Mar 2025 07:00  --------------------------------------------------------  IN: 600 mL / OUT: 1450 mL / NET: -850 mL      I&O's Detail    09 Mar 2025 07:01  -  10 Mar 2025 07:00  --------------------------------------------------------  IN:    Oral Fluid: 600 mL  Total IN: 600 mL    OUT:    Indwelling Catheter - Urethral (mL): 1450 mL  Total OUT: 1450 mL    Total NET: -850 mL          PHYSICAL EXAM:   NEURO/GENERAL: GCS 15. NAD. A&Ox4. no focal deficits   RESP: equal chest rise b/l, no signs of respiratory distress on RA.   CARDIAC: S1,S2. RRR on monitor.   GI: abdomen soft, nondistended, nontender.   : urinary catheter in place   EXTREMITIES: Tender to palpation at right hip. Moving all extremities.  SKIN: no DTI noted.

## 2025-03-10 NOTE — PHYSICAL THERAPY INITIAL EVALUATION ADULT - PERTINENT HX OF CURRENT PROBLEM, REHAB EVAL
74 year old female, history of anxiety and depression, presented as a trauma consult s/p unwitnessed syncopal fall down her stairs with + HT and -AC. Patient is AAOx3, GCS15. Found with acute subarachnoid and intraparenchymal right anterior sylvian fissure and anteroinferior right temporal lobe hemorrhage, chronic left SDH 1.7cm - stable repeat scan. Neuroendovascular consulted for evaluation for possible MMA embolization on this admission. Patient seen and examined in SICU, no complaints.

## 2025-03-10 NOTE — PROGRESS NOTE ADULT - ASSESSMENT
Assessment and Recommendation:   74F s/p fall with acute on chronic SDH, acute SAH/IPH, R intertrochanteric femur fx.     NEURO/PSYCH:  #acute on chronic SDH, acute SAH/IPH  - Neurosurgery - no surgical intervention, q4hr neuro checks, keppra 500bid x days   - neuroendovascular consult pending for possible MMA embolization  - Head CT #2: stable  - q4h neuro checks  #Acute pain  - APAP prn  - lidocaine patch  #PMHx of anxiety/depression  - continue home escitalopram 20mg QD  - continue home mirtazapine 7.5mg QD   - continue home xanax 0.5mg QD  - continue home QUEtiapine 100mg QHS     RESP:   #Oxygenation  - tolerating RA  - encourage incentive spirometry  #Activity  - increase as tolerated    CARDIAC:   #syncopal fall  - no cardiac PMHx  - TTE, 3/9 - EF 58%, mild MR, mild TR, sclerotic aortic valve  - orthostatics pending  - EKG sinus rhythm    GI/NUTR:   #Diet, Regular  - aspiration precautions, HOB 30  #GI Prophylaxis  - not indicated  #Bowel regimen  - senna  - last BM prior to admission    /RENAL:   #elevated lactate  - Lactate 3.1 on admission, now downtrending to 1.0  #maintain euvolemia   - IVL  #CK on admission  - 270 > 210  - downtrending    Labs:          BUN/Cr -  13/0.6  -->,  18/0.6  -->          [03-09 @ 20:00]Na  132 // K  4.1 // Mg  2.0 // Phos  2.6  [03-09 @ 01:05]Na  134 // K  5.0 // Mg  1.7 // Phos  4.6      HEME/ONC:   #DVT prophylaxis  - mechanical: SCDs  - holding chemical PPx in the setting of intracranial bleed; can likely start today      Labs: Hgb/Hct:  12.3/37.5  (03-09 @ 01:05)  -->,  12.0/35.1  (03-09 @ 20:00)  -->                      Platelets:  181  -->,  178  -->                 PTT/INR:        ID:  #monitor for leukocytosis/fever  - UA negative   #MRSA pending    WBC -  11.70  --->>,  6.59  --->>,  5.29  --->>  Temp trend - 24hrs T(F): 98.4 (03-09 @ 20:00), Max: 100 (03-09 @ 12:00)    ENDO:  #glycemic monitoring  - Glucose goal 140-180. if above 180, start insulin sliding scale    MSK:  #acute right comminuted femoral greater trochanteric fracture  - ortho consulted > non-operative management, elevation and icing  - RLE WBAT  - PT pending     DERM:  #DTI screen negative   - continue to monitor daily skin changes     LINES/DRAINS:  matias CHERRY (3/9)  INDICATION FOR SICU:  q1h neuro checks  DISPO: SICU; Case to be discussed with attending Dr. Frances Assessment and Recommendation:   74F s/p fall with acute on chronic SDH, acute SAH/IPH, R intertrochanteric femur fx.     NEURO/PSYCH:  #acute on chronic SDH, acute SAH/IPH  - Neurosurgery - no surgical intervention, q4hr neuro checks, keppra 500bid x days   - neuroendovascular consult pending for possible MMA embolization  - Head CT #2: stable  - q4h neuro checks  #Acute pain  - APAP prn  - lidocaine patch  #PMHx of anxiety/depression  - continue home escitalopram 20mg QD  - continue home mirtazapine 7.5mg QD   - continue home xanax 0.5mg BID  - continue home QUEtiapine 100mg QHS     RESP:   #Oxygenation  - tolerating RA  - encourage incentive spirometry  #Activity  - increase as tolerated    CARDIAC:   #syncopal fall  - no cardiac PMHx  - TTE, 3/9 - EF 58%, mild MR, mild TR, sclerotic aortic valve  - orthostatics pending  - EKG sinus rhythm    GI/NUTR:   #Diet, Regular  - aspiration precautions, HOB 30  #GI Prophylaxis  - not indicated  #Bowel regimen  - senna  - last BM prior to admission    /RENAL:   #elevated lactate  - Lactate 3.1 on admission, now downtrending to 1.0  #maintain euvolemia   - IVL  #CK on admission  - 270 > 210 > no longer trending  Labs:          BUN/Cr -  13/0.6  -->,  18/0.6  -->          [03-09 @ 20:00]Na  132 // K  4.1 // Mg  2.0 // Phos  2.6      HEME/ONC:   #DVT prophylaxis  - mechanical: SCDs  - Chemical: Lovenox 40 QD    Labs: Hgb/Hct:  12.3/37.5  (03-09 @ 01:05)  -->,  12.0/35.1  (03-09 @ 20:00)  -->                      Platelets:  181  -->,  178  -->                 PTT/INR:        ID:  #monitor for leukocytosis/fever  - UA negative   #MRSA pending  WBC -  11.70  --->>,  6.59  --->>,  5.29  --->>  Temp trend - 24hrs T(F): 98.4 (03-09 @ 20:00), Max: 100 (03-09 @ 12:00)    ENDO:  #glycemic monitoring  - Glucose goal 140-180. if above 180, start insulin sliding scale    MSK:  #acute right comminuted femoral greater trochanteric fracture  - ortho consulted > non-operative management, elevation and icing  - RLE WBAT  - PT pending     DERM:  #DTI screen negative   - continue to monitor daily skin changes     LINES/DRAINS:  matias CHERRY (3/9)  INDICATION FOR SICU:  q1h neuro checks  DISPO: likely DG to 4C; Case to be discussed with attending Dr. Frances Assessment and Recommendation:   74F s/p fall with acute on chronic SDH, acute SAH/IPH, R intertrochanteric femur fx.     NEURO/PSYCH:  #acute on chronic SDH, acute SAH/IPH  - Neurosurgery - no surgical intervention, q4hr neuro checks, keppra 500bid x days   - neuroendovascular consult pending for possible MMA embolization  - Head CT #2: stable  - q4h neuro checks  #Acute pain  - APAP prn  - lidocaine patch  #PMHx of anxiety/depression  - continue home escitalopram 20mg QD  - continue home mirtazapine 7.5mg QD   - continue home xanax 0.5mg BID  - continue home QUEtiapine 100mg QHS     RESP:   #Oxygenation  - tolerating RA  - encourage incentive spirometry  #Activity  - increase as tolerated    CARDIAC:   #syncopal fall  - no cardiac PMHx  - TTE performed 3/9 - EF 58%, mild MR, mild TR, sclerotic aortic valve  - orthostatics pending  - EKG sinus rhythm    GI/NUTR:   #Diet, Regular  - aspiration precautions, HOB 30  #GI Prophylaxis  - not indicated  #Bowel regimen  - senna  - last BM 3/9    /RENAL:   #elevated lactate  - Lactate 3.1 on admission, now 1.0  - no longer trending   #CK on admission  - 270 > 210 > no longer trending  Labs:          BUN/Cr -  13/0.6  -->,  18/0.6  -->          [03-09 @ 20:00]Na  132 // K  4.1 // Mg  2.0 // Phos  2.6      HEME/ONC:   #DVT prophylaxis  - mechanical: SCDs  - Chemical: Lovenox 40 QD    Labs: Hgb/Hct:  12.3/37.5  (03-09 @ 01:05)  -->,  12.0/35.1  (03-09 @ 20:00)  -->                      Platelets:  181  -->,  178  -->                 PTT/INR:        ID:  #monitor for leukocytosis/fever  - UA negative   #MRSA pending  WBC -  11.70  --->>,  6.59  --->>,  5.29  --->>  Temp trend - 24hrs T(F): 98.4 (03-09 @ 20:00), Max: 100 (03-09 @ 12:00)    ENDO:  #glycemic monitoring  - Glucose goal 140-180. if above 180, start insulin sliding scale    MSK:  #acute right comminuted femoral greater trochanteric fracture  - ortho consulted > non-operative management, elevation and icing  - RLE WBAT  - PT pending     DERM:  #DTI screen negative   - continue to monitor daily skin changes     LINES/DRAINS:  matias CHERRY (3/9)  INDICATION FOR SICU:  q1h neuro checks  DISPO: likely DG to 4C; Case to be discussed with attending Dr. Frances Assessment and Recommendation:   74F s/p fall with acute on chronic SDH, acute SAH/IPH, R greater trochanteric femur fx.     NEURO/PSYCH:  #acute on chronic SDH, acute SAH/IPH  - Neurosurgery - no surgical intervention, q4hr neuro checks, keppra 500bid x 7days   - neuroendovascular consult pending for possible MMA embolization  - Head CT #2: stable  - q4h neuro checks  #Acute pain  - APAP prn  - lidocaine patch  #PMHx of anxiety/depression  - continue home escitalopram 20mg QD  - continue home mirtazapine 7.5mg QD   - continue home xanax 0.5mg BID  - continue home QUEtiapine 100mg QHS     RESP:   #Oxygenation  - tolerating RA  - encourage incentive spirometry  #Activity  - increase as tolerated    CARDIAC:   #syncopal fall  - no cardiac PMHx  - TTE performed 3/9 - EF 58%, mild MR, mild TR, sclerotic aortic valve  - orthostatics pending when able to ambulate  - EKG sinus rhythm    GI/NUTR:   #Diet, Regular  - aspiration precautions, HOB 30  #GI Prophylaxis  - not indicated  #Bowel regimen  - senna  - last BM 3/9    /RENAL:   #elevated lactate  - Lactate 3.1 on admission, now 1.0  - no longer trending   #retention  - salcedo placed on admission for retention  - remove salcedo today; TOV pending  Labs:          BUN/Cr -  13/0.6  -->,  18/0.6  -->          [03-09 @ 20:00]Na  132 // K  4.1 // Mg  2.0 // Phos  2.6      HEME/ONC:   #DVT prophylaxis  - mechanical: SCDs  - Chemical: Lovenox 40 QD    Labs: Hgb/Hct:  12.3/37.5  (03-09 @ 01:05)  -->,  12.0/35.1  (03-09 @ 20:00)  -->                      Platelets:  181  -->,  178  -->                 PTT/INR:        ID:  #monitor for leukocytosis/fever  - UA negative   #MRSA pending  WBC -  11.70  --->>,  6.59  --->>,  5.29  --->>  Temp trend - 24hrs T(F): 98.4 (03-09 @ 20:00), Max: 100 (03-09 @ 12:00)    ENDO:  #glycemic monitoring  - Glucose goal 140-180. if above 180, start insulin sliding scale    MSK:  #acute right comminuted femoral greater trochanteric fracture  - ortho consulted > non-operative management, elevation and icing  - RLE WBAT  - PT pending     DERM:  #DTI screen negative   - continue to monitor daily skin changes     LINES/DRAINS:  PIV,  INDICATION FOR SICU:  q1h neuro checks  DISPO: DG to 4C. ; Case to be discussed with attending Dr. Frances

## 2025-03-10 NOTE — PHYSICAL THERAPY INITIAL EVALUATION ADULT - GAIT DEVIATIONS NOTED, PT EVAL
Chief complaint:   Chief Complaint   Patient presents with   • Pharyngitis     fatigue x 1 day       Vitals:  Visit Vitals   • /70 (BP Location: [facility], Patient Position: Sitting, Cuff Size: Regular)   • Pulse 84   • Temp 97.9 °F (36.6 °C) (Oral)   • Resp 12   • Ht 5' 7.5\" (1.715 m)   • Wt 72.5 kg   • SpO2 97%   • BMI 24.66 kg/m2       HISTORY OF PRESENT ILLNESS     HPI  32-year-old female presents with complaining of sore throat x 1 day. Patient states it hurts to swallow. Her left ear hurts. She is tired and achy. She also had fever of 100.9 F at home today morning. She has decrease appetite. She's trying to keep up with fluid intake. She denies any cough, nasal congestion, runny nose, nausea, vomiting, abdominal pain, difficulty breathing, wheezing and dizziness. Patient took Tylenol over-the-counter with minimal relief. She states she used to get strep throat all the time as a kid. Sick contacts at work.  Other significant problems:  There are no active problems to display for this patient.      PAST MEDICAL, FAMILY AND SOCIAL HISTORY     Medications:  Current Outpatient Prescriptions   Medication   • amoxicillin (AMOXIL) 500 MG capsule     No current facility-administered medications for this visit.        Allergies:  ALLERGIES:  No Known Allergies    Past Medical  History/Surgeries:  No past medical history on file.    No past surgical history on file.    Family History:  No family history on file.    Social History:  Social History   Substance Use Topics   • Smoking status: Never Smoker   • Smokeless tobacco: Never Used   • Alcohol use Not on file       REVIEW OF SYSTEMS     Review of Systems   Constitutional: Positive for appetite change, fatigue and fever.   HENT: Positive for sore throat and trouble swallowing. Negative for congestion.    Respiratory: Negative for cough, shortness of breath and wheezing.    Gastrointestinal: Negative for nausea and vomiting.   All other systems reviewed and are  negative.      PHYSICAL EXAM     Physical Exam   Constitutional: She is oriented to person, place, and time. She appears well-developed and well-nourished. No distress.   HENT:   Head: Normocephalic and atraumatic.   Right Ear: Hearing, tympanic membrane, external ear and ear canal normal.   Left Ear: Hearing, tympanic membrane, external ear and ear canal normal.   Nose: Nose normal.   Mouth/Throat: Uvula is midline. Posterior oropharyngeal erythema present.   Erythema of posterior pharyngeal wall noted. Bilateral 1+ erythematous tonsils with tonsillar exudates noted.   Eyes: Conjunctivae and EOM are normal. Pupils are equal, round, and reactive to light.   Neck: Normal range of motion.   Cardiovascular: Normal rate, regular rhythm, normal heart sounds and intact distal pulses.    Pulmonary/Chest: Effort normal and breath sounds normal.   Lungs- no use of accessory muscles of respiration Good and equal air entry in both lung fields. No wheezing, rales or rhonchi noted.      Lymphadenopathy:     She has cervical adenopathy (bilateral submandibular lymph nodes-<1cm, soft, mobile, tender to palpation noted.).   Neurological: She is alert and oriented to person, place, and time.   Psychiatric: She has a normal mood and affect. Her behavior is normal.       ASSESSMENT/PLAN     Beata was seen today for pharyngitis.    Diagnoses and all orders for this visit:    Acute pharyngitis, unspecified etiology    Other orders  -     amoxicillin (AMOXIL) 500 MG capsule; Take 1 capsule by mouth 2 times daily for 10 days.      -Rapid strep done in clinic today is negative  -We will treat based on Center's criteria.  -Take amoxicillin 500 mg-1 capsule by mouth 2 times a day for 10 days.  -Take Motrin 200  Mg to 400 mg oral every 6 to 8 hrs with food as needed.  -Drink plenty of fluids.  -Warm salt gargles .  -To  Use throat lozenges as needed for discomfort.  -Advised steam inhalation, hot shower.  -Follow with primary doctor  if  symptoms worsen or no improvement.  -Written instructions given  -Patient understands and agrees with the plan.        increased time in double stance

## 2025-03-10 NOTE — PHYSICAL THERAPY INITIAL EVALUATION ADULT - GAIT TRAINING, PT EVAL
will ambulate 100 ft with RW, modified independence in 3 days; will negotiate 2 steps with 1 rail, CS

## 2025-03-10 NOTE — CONSULT NOTE ADULT - SUBJECTIVE AND OBJECTIVE BOX
This is a 74 year old Female, with hx of anxiety and depression, s/p fall with acute on chronic SDH, acute SAH/IPH, R intertrochanteric femur fx.       1. Fall/Syncope complicated by acute on chronic subdural hematoms + SAH + right intertrochanteric femur fracture  - Admit to SICU        -ECG:WNL   - Started on Keppra as per neurosx   - Pain meds prn with laxative   - encourage incentive spirometry  - TTE performed 3/9 - EF 58%, mild MR, mild TR, sclerotic aortic valve  - Head CT #2: stable  - q4h neuro checks  - RLE WBAT  - Neurosx consult appreciated  - Neuroendovascular consult for possible MMA embolization   - ortho consulted > non-operative management, elevation and icing  - PT eval         2. anxiety/depression  - continue home escitalopram 20mg QD  - continue home mirtazapine 7.5mg QD   - continue home xanax 0.5mg BID  - continue home QUEtiapine 100mg QHS     3.Lactic acidosis resolved     4. Mild acute hyponatremia with mild elevation in K   - Can check serum osm, TSH and cortisol am for further eval     5. DVT prophylaxis  - mechanical: SCDs  - Chemical: Lovenox 40 QD         This is a 74 year old Female, with hx of anxiety and depression, s/p fall with acute on chronic SDH, acute SAH/IPH, R intertrochanteric femur fx.       VITALS:   T(C): 36.5 (03-10-25 @ 07:55), Max: 37.4 (03-09-25 @ 16:00)  HR: 106 (03-10-25 @ 13:00) (82 - 106)  BP: 119/79 (03-10-25 @ 13:00) (99/54 - 135/64)  RR: 17 (03-10-25 @ 13:00) (16 - 19)  SpO2: 97% (03-10-25 @ 13:00) (96% - 98%)    GENERAL: NAD, lying in bed comfortably  HEART: Regular rate and rhythm  LUNGS:GBAE   ABDOMEN: Soft, nontender, nondistended, +BS  EXTREMITIES: 2+ peripheral pulses bilaterally.   NERVOUS SYSTEM:  A&Ox3      1. Fall/Syncope complicated by acute on chronic subdural hematoms + SAH + right intertrochanteric femur fracture  - Admit to SICU        -ECG:WNL   - Started on Keppra as per neurosx   - Pain meds prn with laxative   - encourage incentive spirometry  - TTE performed 3/9 - EF 58%, mild MR, mild TR, sclerotic aortic valve  - Head CT #2: stable  - q4h neuro checks  - RLE WBAT  - Neurosx consult appreciated  - Neuroendovascular consult for possible MMA embolization   - ortho consulted > non-operative management, elevation and icing  - PT eval       2. anxiety/depression  - continue home escitalopram 20mg QD  - continue home mirtazapine 7.5mg QD   - continue home xanax 0.5mg BID  - continue home QUEtiapine 100mg QHS     3.Lactic acidosis resolved     4. Mild acute hyponatremia with mild elevation in K   - May check serum osm, TSH and cortisol am for further eval     5. SInus tachycardia seems due to anxiet  * Denies sob and chest pain    -Watch for any withdrawal sx     6. DVT prophylaxis  - mechanical: SCDs  - Chemical: Lovenox 40 QD

## 2025-03-10 NOTE — CONSULT NOTE ADULT - SUBJECTIVE AND OBJECTIVE BOX
Neuroendovascular Consult note:   Consulted for: MMA embolization     HPI:  Patient is a 74 year old female, history of anxiety and depression, presented as a trauma consult s/p unwitnessed syncopal fall down her stairs with + HT and -AC. Patient is AAOx3, GCS15. Found with acute subarachnoid and intraparenchymal right anterior sylvian fissure and anteroinferior right temporal lobe hemorrhage, chronic left SDH 1.7cm - stable repeat scan. Neuroendovascular consulted for evaluation for possible MMA embolization on this admission. Patient seen and examined in SICU, no complaints.        Past medical history:   Anxiety    Depression, major        Medications:  ALPRAZolam 0.5 milliGRAM(s) Oral every 12 hours  chlorhexidine 2% Cloths 1 Application(s) Topical daily  enoxaparin Injectable 40 milliGRAM(s) SubCutaneous every 24 hours  escitalopram 20 milliGRAM(s) Oral daily  levETIRAcetam 500 milliGRAM(s) Oral every 12 hours  mirtazapine 7.5 milliGRAM(s) Oral at bedtime  QUEtiapine 100 milliGRAM(s) Oral at bedtime  senna 2 Tablet(s) Oral at bedtime      Vitals:   T(F): 97.7 (03-10-25 @ 07:55), Max: 99.3 (03-09-25 @ 16:00)  HR: 106 (03-10-25 @ 13:00) (82 - 106)  BP: 119/79 (03-10-25 @ 13:00) (99/54 - 135/64)  RR: 17 (03-10-25 @ 13:00) (16 - 19)  SpO2: 97% (03-10-25 @ 13:00) (96% - 98%)    Labs:                         12.0   5.29  )-----------( 178      ( 09 Mar 2025 20:00 )             35.1     03-09    132[L]  |  98  |  18  ----------------------------<  136[H]  4.1   |  25  |  0.6[L]    Ca    8.1[L]      09 Mar 2025 20:00  Phos  2.6     03-09  Mg     2.0     03-09    TPro  5.7[L]  /  Alb  3.9  /  TBili  0.9  /  DBili  0.3  /  AST  42[H]  /  ALT  29  /  AlkPhos  82  03-09    PT/INR - ( 08 Mar 2025 15:27 )   PT: 9.90 sec;   INR: 0.84 ratio         PTT - ( 08 Mar 2025 15:27 )  PTT:31.7 sec  LIVER FUNCTIONS - ( 09 Mar 2025 01:05 )  Alb: 3.9 g/dL / Pro: 5.7 g/dL / ALK PHOS: 82 U/L / ALT: 29 U/L / AST: 42 U/L / GGT: x             Exam:   General - NAD   Neuro - AAO3, follows commands, gaze midline, VFF, smile symmetric, moves all extremities to antigravity without drifts, sensation intact, no neglect, no aphasia, no dysarthria, no dysmetria on finger to nose     Radiology:   c< from: CT Head No Cont (03.08.25 @ 20:46) >  IMPRESSION:    No significant interval change since CT head 3/8/2025 at 4:24 PM.    Unchanged acute extra-axial hemorrhage primarily along right sylvian   fissure and anterior right middle cranial fossa, with possible   intraparenchymal component at right anteroinferior temporal lobe,   measuring up to 1.6 cm.    Unchanged predominantly chronic subdural collection along the left   frontoparietal convexity measuring up to 1.7 cm in greatest thickness and   associated with unchanged 0.7 cm rightward midline shift, with unchanged   small posterior medial hyperdense,acute component.    No new intracranial hemorrhage.      < end of copied text >  < from: CT Head No Cont (03.08.25 @ 16:26) >  IMPRESSION:    CT HEAD:    1.  Acute hematomawith likely subarachnoid and intraparenchymal   components layering in the right anterior sylvian fissure and involving   the anteroinferior right temporal lobe.  2.  Predominantly hypodense subdural hematoma along the left   frontoparietal convexitymeasuring up to 1.7 cm in greatest diameter and   associated with a 0.6 cm rightward midline shift. Probable trace   hyperdense medial component. Findings compatible with chronic left   subdural hematoma with trace acute or subacute hemorrhage.  CT CERVICAL SPINE:  1.  No acute cervical fracture or facet subluxation.      < end of copied text >      Assessment:   Patient is a 74 year old female, history of anxiety and depression, presented as a trauma consult s/p unwitnessed syncopal fall down her stairs with + HT and -AC. Patient is AAOx3, GCS15. Found with acute subarachnoid and intraparenchymal right anterior sylvian fissure and anteroinferior right temporal lobe hemorrhage, chronic left SDH 1.7cm - stable repeat scan. Neuroendovascular consulted for evaluation for possible MMA embolization on this admission. Patient seen and examined in SICU, no complaints.     Discussed indications for undergoing middle meningeal artery embolization in the setting of recent fall and left chronic SDH 1.7cm. Patient understands the indication for decreasing risk of SDH expansion, however does not wish to undergo invasive neurologic procedures at this time. Patient understands that there is a risk for SDH expansion and neurologic deterioration if MMA embolization is deferred, however still does not wish to undergo procedure at this time.     Suggestions:  At this time, patient is declining to undergo middle meningeal artery embolization procedure.   Will follow up 2 weeks post discharge with a repeat CTH at that time to trend SDH and discuss potential MMA embo planning electively if stable.   Would recommend repeat CTH prior to inpatient discharge - if stable follow up w Dr Metz [address]   Discussed with Dr Metz and SICU   [phone]

## 2025-03-10 NOTE — PROGRESS NOTE ADULT - CRITICAL CARE ATTENDING COMMENT
Patient is awake.  Complains of right hip pain to motion.  On incentive spirometer able to do 500 ml.    PE:  AAO x3  Chest: decreased breath sounds in bilateral lung bases  CV : RRR  Abdomen: soft  Extr: right hip pain    ASSESSMENT:  73 y/o female, S/P Syncope and Fall.  Traumatic Right SAH.  Traumatic Left Acute on Chronic SDH.  Brain compression, L-to-R.  At risk for neurologic deficit.  Anxiety. Depression.  Closed Right Greater Trochanteric Fracture.  Acute pain due to trauma.    PLAN:  - continue intermittent neuro checks  - on Keppra 500 bid  - anxiety/depression - on home escitalopram 20mg QD; mirtazapine 7.5mg QD; xanax 0.5mg BID; QUEtiapine 100mg QHS  - encourage incentive spirometer  - chest PT  - keep normotensive   - syncopal workup in progress, needs orthostatic VS  - regular diet  - aspiration precautions  - follow serum electrolytes and UOP  - ID - universal precautions   - DVT proph  - Ortho f/u - WBAT  - needs PT eval Patient is awake.  Complains of right hip pain to motion.  On incentive spirometer able to do 500 ml.  Na is 132 today    PE:  AAO x3  Chest: decreased breath sounds in bilateral lung bases  CV : RRR  Abdomen: soft  Extr: right hip pain    ASSESSMENT:  75 y/o female, S/P Syncope and Fall.  Traumatic Right SAH.  Traumatic Left Acute on Chronic SDH.  Brain compression, L-to-R.  At risk for neurologic deficit.  Anxiety. Depression.  Closed Right Greater Trochanteric Fracture.  Acute pain due to trauma.  Hyponatremia.    PLAN:  - continue intermittent neuro checks  - on Keppra 500 bid  - anxiety/depression - on home escitalopram 20mg QD; mirtazapine 7.5mg QD; xanax 0.5mg BID; QUEtiapine 100mg QHS  - encourage incentive spirometer  - chest PT  - keep normotensive   - syncopal workup in progress, needs orthostatic VS  - regular diet  - aspiration precautions  - follow serum electrolytes and UOP  - put on salt tabs  - ID - universal precautions   - DVT proph  - Ortho f/u - WBAT  - needs PT eval

## 2025-03-11 ENCOUNTER — TRANSCRIPTION ENCOUNTER (OUTPATIENT)
Age: 75
End: 2025-03-11

## 2025-03-11 VITALS
OXYGEN SATURATION: 98 % | DIASTOLIC BLOOD PRESSURE: 61 MMHG | SYSTOLIC BLOOD PRESSURE: 128 MMHG | TEMPERATURE: 97 F | HEART RATE: 81 BPM | RESPIRATION RATE: 19 BRPM

## 2025-03-11 PROCEDURE — 99232 SBSQ HOSP IP/OBS MODERATE 35: CPT

## 2025-03-11 PROCEDURE — 99231 SBSQ HOSP IP/OBS SF/LOW 25: CPT

## 2025-03-11 RX ORDER — LEVETIRACETAM 10 MG/ML
1 INJECTION, SOLUTION INTRAVENOUS
Qty: 6 | Refills: 0
Start: 2025-03-11 | End: 2025-03-13

## 2025-03-11 RX ORDER — ACETAMINOPHEN 500 MG/5ML
2 LIQUID (ML) ORAL
Qty: 0 | Refills: 0 | DISCHARGE
Start: 2025-03-11

## 2025-03-11 RX ORDER — LIDOCAINE HYDROCHLORIDE 20 MG/ML
0 JELLY TOPICAL
Qty: 0 | Refills: 0 | DISCHARGE
Start: 2025-03-11

## 2025-03-11 RX ADMIN — LEVETIRACETAM 500 MILLIGRAM(S): 10 INJECTION, SOLUTION INTRAVENOUS at 05:22

## 2025-03-11 RX ADMIN — Medication 0.5 MILLIGRAM(S): at 05:23

## 2025-03-11 RX ADMIN — Medication 1 APPLICATION(S): at 11:05

## 2025-03-11 RX ADMIN — ESCITALOPRAM OXALATE 20 MILLIGRAM(S): 20 TABLET ORAL at 11:07

## 2025-03-11 NOTE — DISCHARGE NOTE PROVIDER - HOSPITAL COURSE
74yF w/ PMHx of anxiety and depression seen as a trauma consult s/p unwitnessed syncopal fall in her home this morning +HT, +LOC, -AC.  She lives alone in her home and her daughter was worried she wasn't answering her phone. Her daughter went over to her home to find her lying on the floor. Patient did not recall what happened, did not remember falling or feeling dizzy beforehand She is unsure how or where she fell. She complained of right hip pain and left rib pain. Daughter was able to get her up but then called EMS. Trauma assessment in ED: ABCs intact , GCS 15 , AAOx3.  Patient was found to have and acute on chornic left SDH, acute right SAH/ Intraparenchymal hematoma, and an acute right greater trochanteric fracture; admitted to SICU for q1 neurochecks.   Orthopedics consulted for right greater trochanteric fracture, recommended non-operative management with PT/OT, pain control, and WBAT RLE.  Repeat CTH was stable, approved for q4 neurochecks by neurosurgery.   Syncope workup negative, orthostatics negative, echo with 58% EF, mild MF, TR.  Neuro IR consulted, patient declined to undergo MMA embolization procedure at this time. Will follow up 2 weekss post discharge with a repeat CTH to trend SDH and discuss potential MMA embolization planning electively if stable.   Physical therapy evaluated and recommend Home OT with rolling walker.     CT Head No Cont (03.08.25 @ 20:46)   No significant interval change since CT head 3/8/2025 at 4:24 PM.  Unchanged acute extra-axial hemorrhage primarily along right sylvian   fissure and anterior right middle cranial fossa, with possible   intraparenchymal component at right anteroinferior temporal lobe,   measuring up to 1.6 cm.  Unchanged predominantly chronic subdural collection along the left   frontoparietal convexity measuring up to 1.7 cm in greatest thickness and   associated with unchanged 0.7 cm rightward midline shift, with unchanged   small posterior medial hyperdense,acute component.  No new intracranial hemorrhage.    CT Abdomen and Pelvis w/ IV Cont (03.08.25 @ 16:37)   Acute, comminuted right femoral GREATER trochanteric fracture.      CT Cervical Spine No Cont (03.08.25 @ 16:37)   CT HEAD:  1.  Acute hematoma with likely subarachnoid and intraparenchymal   components layering in the right anterior sylvian fissure and involving   the anteroinferior right temporal lobe.  2.  Predominantly hypodense subdural hematoma along the left   frontoparietal convexity measuring up to 1.7 cm in greatest diameter and   associated with a 0.6 cm rightward midline shift. Probable trace   hyperdense medial component. Findings compatible with chronic left   subdural hematoma with trace acute or subacute hemorrhage.  CT CERVICAL SPINE:  1.  No acute cervical fracture or facet subluxation.

## 2025-03-11 NOTE — PROGRESS NOTE ADULT - NS ATTEND AMEND GEN_ALL_CORE FT
Patient is AAO x3  Complains of some headache and right hip pain  Had 2D Echo - EF 58%.  Had orthostatics wnl.  Creat 0.7  On IS able to do 750 ml    PE:  AAO x3  Chest: decreased breath sounds in bilateral lung bases  CV : RRR  Abdomen: soft  Extr: right hip pain    ASSESSMENT:  75 y/o female, S/P Syncope and Fall.  Traumatic Right SAH.  Traumatic Left Acute on Chronic SDH.  Brain compression, L-to-R.  At risk for neurologic deficit.  Anxiety. Depression.  Closed Right Greater Trochanteric Fracture.  Acute pain due to trauma.    PLAN:  - intermittent neuro checks; on Keppra 500 bid x 7d  - anxiety/depression - on home escitalopram 20mg QD; mirtazapine 7.5mg QD; xanax 0.5mg BID; QUEtiapine 100mg QHS  - encourage incentive spirometer; chest PT  - keep normotensive; syncopal workup   - regular diet; bowel regiment  - aspiration precautions at all time  - follow serum electrolytes and UOP  - ID - universal precautions   - DVT proph  - PT needed

## 2025-03-11 NOTE — PROGRESS NOTE ADULT - ASSESSMENT
74yF w/ PMHx of anxiety and depression seen as a trauma consult s/p unwitnessed syncopal fall in her home this morning +HT, +LOC, -AC.  She lives alone in her home and her daughter was worried she wasn't answering her phone. Her daughter went over to her home to find her lying on the floor. Patient did not recall what happened, did not remember falling or feeling dizzy beforehand She is unsure how or where she fell. She complained of right hip pain and left rib pain. Daughter was able to get her up but then called EMS. Trauma assessment in ED: ABCs intact , GCS 15 , AAOx3.    PLAN:  - Neurovasc: outpt MMA embolization, 2 wks post discharge appt, w/ rpt CTH at that time, repeat CTH before discharge from hospital  - Q4hr neuro checks   - syncope workup: orthostatics negative, echo w/ 58% EF, mild MR, TR   - Ortho consult appreciated- non-operative management  - ok for DVT ppx  - c/w reg diet  - f/u CM today for dispo home w/ PT

## 2025-03-11 NOTE — DISCHARGE NOTE NURSING/CASE MANAGEMENT/SOCIAL WORK - NSDCPECAREGIVERED_GEN_ALL_CORE
"     Peripherally Inserted Central Line Catheter (PICC):    Patient Name: Female-Katherine Nicholas  MRN: [id number]      March 22, 
Yes
Stable

## 2025-03-11 NOTE — CHART NOTE - NSCHARTNOTEFT_GEN_A_CORE
LETTER OF MEDICAL NECESSITY  Patient is a 74 year old female with pmhx of anxiety and depression admitted after a fall sustaining acute right SAH and acute right greater trochanteric fracture. The pt has a mobility limitation that significantly impairs the pts ability to participate in one or more MRADLs such as toileting, eating, dressing, and bathing in customary locations in the home. The pts home provides adequate access between rooms for the use of the rolling walker.  The rolling walker will significantly improve the pts ability to participate in MRADLs and will be used on a regular basis in the home.

## 2025-03-11 NOTE — DISCHARGE NOTE NURSING/CASE MANAGEMENT/SOCIAL WORK - PATIENT PORTAL LINK FT
You can access the FollowMyHealth Patient Portal offered by [facility] by registering at the following website: [email or web address]. By joining [facility]’s FollowMyHealth portal, you will also be able to view your health information using other applications (apps) compatible with our system.

## 2025-03-11 NOTE — CONSULT NOTE ADULT - SUBJECTIVE AND OBJECTIVE BOX
HPI:    74yF w/ PMHx of anxiety and depression seen as a trauma consult s/p unwitnessed syncopal fall in her home this morning +HT, +LOC, -AC.  She lives alone in her home and her daughter was worried she wasn't answering her phone. Her daughter went over to her home to find her lying on the floor. Patient did not recall what happened, did not remember falling or feeling dizzy beforehand She is unsure how or where she fell. She complained of right hip pain and left rib pain. Daughter was able to get her up but then called EMS. Trauma assessment in ED: ABCs intact , GCS 15 , AAOx3.    S/p fall with acute on chronic SDH, acute SAH/IPH, R hi greatertrochanteric femur fx, WBAT RLE as per ortho         PAST MEDICAL & SURGICAL HISTORY:  Anxiety      Depression, major          Hospital Course:    TODAY'S SUBJECTIVE & REVIEW OF SYMPTOMS:     Constitutional WNL   Cardio WNL   Resp WNL   GI WNL  Heme WNL  Endo WNL  Skin WNL  MSK right hip pain   Neuro syncope   Cognitive WNL  Psych WNL      MEDICATIONS  (STANDING):  ALPRAZolam 0.5 milliGRAM(s) Oral every 12 hours  chlorhexidine 2% Cloths 1 Application(s) Topical daily  enoxaparin Injectable 40 milliGRAM(s) SubCutaneous every 24 hours  escitalopram 20 milliGRAM(s) Oral daily  levETIRAcetam 500 milliGRAM(s) Oral every 12 hours  mirtazapine 7.5 milliGRAM(s) Oral at bedtime  QUEtiapine 100 milliGRAM(s) Oral at bedtime  senna 2 Tablet(s) Oral at bedtime    MEDICATIONS  (PRN):  acetaminophen     Tablet .. 650 milliGRAM(s) Oral every 6 hours PRN Mild Pain (1 - 3)  lidocaine   4% Patch 1 Patch Transdermal daily PRN hip pain      FAMILY HISTORY:      Allergies    No Known Allergies    Intolerances        SOCIAL HISTORY:    [  ] Etoh  [  ] Smoking  [  ] Substance abuse     Home Environment:  [ x  ] Home Alone  [   ] Lives with Family  [   ] Home Health Aid    Dwelling:  [   ] Apartment  [ x  ] Private House  [   ] Adult Home  [   ] Skilled Nursing Facility      [   ] Short Term  [   ] Long Term  [ x  ] Stairs       Elevator [   ]    FUNCTIONAL STATUS PTA: (Check all that apply)  Ambulation: [  x  ]Independent    [   ] Dependent     [   ] Non-Ambulatory  Assistive Device: [   ] SA Cane  [   ]  Q Cane  [   ] Walker  [   ]  Wheelchair  ADL : [  x ] Independent  [    ]  Dependent       Vital Signs Last 24 Hrs  T(C): 36.5 (11 Mar 2025 08:00), Max: 36.5 (11 Mar 2025 08:00)  T(F): 97.7 (11 Mar 2025 08:00), Max: 97.7 (11 Mar 2025 08:00)  HR: 95 (11 Mar 2025 12:00) (84 - 117)  BP: 125/79 (11 Mar 2025 12:00) (98/55 - 129/69)  BP(mean): 95 (11 Mar 2025 12:00) (72 - 95)  RR: 20 (11 Mar 2025 12:00) (17 - 20)  SpO2: 97% (11 Mar 2025 12:00) (94% - 100%)    Parameters below as of 11 Mar 2025 12:00  Patient On (Oxygen Delivery Method): room air          PHYSICAL EXAM: Awake & Alert  GENERAL: NAD  HEAD:  Normocephalic  CHEST/LUNG: Clear   HEART: S1S2+  ABDOMEN: Soft, Nontender  EXTREMITIES:  no calf tenderness    NERVOUS SYSTEM:  Cranial Nerves 2-12 intact [   ] Abnormal  [   ]  ROM: WFL all extremities [   ]  Abnormal [x   ]limited RLE   Motor Strength: WFL all extremities  [   ]  Abnormal [  x ]limited RLE   Sensation: intact to light touch [  x ] Abnormal [   ]    FUNCTIONAL STATUS:  Bed Mobility: Independent [   ]  Supervision [   ]  Needs Assistance [   ]  N/A [   ]  Transfers: Independent [   ]  Supervision [ x  ]  Needs Assistance [   ]  N/A [   ]   Ambulation: Independent [   ]  Supervision [ x  ]  Needs Assistance [   ]  N/A [   ]  ADL: Independent [   ] Requires Assistance [   ] N/A [   ]      LABS:                        12.0   5.91  )-----------( 190      ( 10 Mar 2025 20:11 )             37.1     03-10    136  |  98  |  12  ----------------------------<  116[H]  4.4   |  27  |  0.7    Ca    8.6      10 Mar 2025 20:11  Phos  2.7     03-10  Mg     1.8     03-10        Urinalysis Basic - ( 10 Mar 2025 20:11 )    Color: x / Appearance: x / SG: x / pH: x  Gluc: 116 mg/dL / Ketone: x  / Bili: x / Urobili: x   Blood: x / Protein: x / Nitrite: x   Leuk Esterase: x / RBC: x / WBC x   Sq Epi: x / Non Sq Epi: x / Bacteria: x        RADIOLOGY & ADDITIONAL STUDIES:

## 2025-03-11 NOTE — PROGRESS NOTE ADULT - NS ATTEND OPT1 GEN_ALL_CORE
I attest my time as attending is greater than 50% of the total combined time spent on qualifying patient care activities by the PA/NP and attending. Male

## 2025-03-11 NOTE — PROGRESS NOTE ADULT - SUBJECTIVE AND OBJECTIVE BOX
TRAUMA SURGERY PROGRESS NOTE     NELSON EUBANKS  [address] [place] [address] day :4d      Surgical Attending: Dr. Emery  24 hour events: downgraded yesterday, pending floor bed. +ToV, PT: home PT w/ RW. Ambulated around the units. Afebrile, vitals WNLs, on room air.    PHYSICAL EXAM:  General: NAD, awake and alert, oriented x3  HEENT: Normocephalic, right frontal scalp hematoma.   Respiratory: Normal work of breathing on RA  Abdomen: Soft, non-distended, non-tender, no rebound, no guarding.  Groin: Normal appearing, pelvis stable   Ext:  Moving b/l upper and lower extremities, sensation grossly       T(F): 97.3 (03-11-25 @ 04:00), Max: 97.8 (03-10-25 @ 12:00)  HR: 84 (03-11-25 @ 04:00) (84 - 117)  BP: 101/59 (03-11-25 @ 04:00) (98/55 - 135/64)  ABP: --  ABP(mean): --  RR: 18 (03-10-25 @ 19:00) (17 - 19)  SpO2: 100% (03-11-25 @ 04:00) (94% - 100%)    IN'S / OUT's:    03-09-25 @ 07:01  -  03-10-25 @ 07:00  --------------------------------------------------------  IN:    Oral Fluid: 920 mL  Total IN: 920 mL    OUT:    Indwelling Catheter - Urethral (mL): 1750 mL  Total OUT: 1750 mL    Total NET: -830 mL      03-10-25 @ 07:01  -  03-11-25 @ 06:45  --------------------------------------------------------  IN:    IV PiggyBack: 50 mL    IV PiggyBack: 250 mL  Total IN: 300 mL    OUT:    Indwelling Catheter - Urethral (mL): 500 mL    Voided (mL): 250 mL  Total OUT: 750 mL    Total NET: -450 mL          MEDICATIONS  (STANDING):  ALPRAZolam 0.5 milliGRAM(s) Oral every 12 hours  chlorhexidine 2% Cloths 1 Application(s) Topical daily  enoxaparin Injectable 40 milliGRAM(s) SubCutaneous every 24 hours  escitalopram 20 milliGRAM(s) Oral daily  levETIRAcetam 500 milliGRAM(s) Oral every 12 hours  mirtazapine 7.5 milliGRAM(s) Oral at bedtime  QUEtiapine 100 milliGRAM(s) Oral at bedtime  senna 2 Tablet(s) Oral at bedtime    MEDICATIONS  (PRN):  acetaminophen     Tablet .. 650 milliGRAM(s) Oral every 6 hours PRN Mild Pain (1 - 3)  lidocaine   4% Patch 1 Patch Transdermal daily PRN hip pain      LABS  Labs:  CAPILLARY BLOOD GLUCOSE                              12.0   5.91  )-----------( 190      ( 10 Mar 2025 20:11 )             37.1         03-10    136  |  98  |  12  ----------------------------<  116[H]  4.4   |  27  |  0.7      Calcium: 8.6 mg/dL (03-10-25 @ 20:11)      LFTs:     Lactate, Blood: 1.0 mmol/L (03-09-25 @ 01:05)  Lactate, Blood: 3.1 mmol/L (03-08-25 @ 15:27)      Coags:            Urinalysis Basic - ( 10 Mar 2025 20:11 )    Color: x / Appearance: x / SG: x / pH: x  Gluc: 116 mg/dL / Ketone: x  / Bili: x / Urobili: x   Blood: x / Protein: x / Nitrite: x   Leuk Esterase: x / RBC: x / WBC x   Sq Epi: x / Non Sq Epi: x / Bacteria: x            RADIOLOGY & ADDITIONAL TESTS:

## 2025-03-11 NOTE — DISCHARGE NOTE PROVIDER - NSDCMRMEDTOKEN_GEN_ALL_CORE_FT
acetaminophen 325 mg oral tablet: 2 tab(s) orally every 6 hours As needed Mild Pain (1 - 3)  levETIRAcetam 500 mg oral tablet: 1 tab(s) orally every 12 hours  Lexapro 20 mg oral tablet: 1 tab(s) orally once a day  lidocaine 4% topical film: Apply topically to affected area once a day as needed for  moderate pain  mirtazapine 7.5 mg oral tablet: 2 tab(s) orally once a day  Seroquel 100 mg oral tablet: 1 tab(s) orally once a day (at bedtime)  Xanax 0.5 mg oral tablet: 1 tab(s) orally once a day (at bedtime)

## 2025-03-11 NOTE — PROGRESS NOTE ADULT - ASSESSMENT
Assessment and Recommendation:   74F s/p fall with acute on chronic SDH, acute SAH/IPH, R intertrochanteric femur fx.     NEURO/PSYCH:  #acute on chronic SDH, acute SAH/IPH  - Neurosurgery - no surgical intervention, q4hr neuro checks, keppra 500bid x days   - neuroendovascular consulted > patient is declining. f/u 2 weeks after discharge with repeat CTH and discuss possible MMA embol. Recommending repeat CTH prior to inpatient discharge - if stable follow up w Dr Metz- Head CT #2: stable  - q4h neuro checks  #Acute pain  - APAP prn  - lidocaine patch  #PMHx of anxiety/depression  - continue home escitalopram 20mg QD  - continue home mirtazapine 7.5mg QD   - continue home xanax 0.5mg QD  - continue home QUEtiapine 100mg QHS     RESP:   #Oxygenation  - tolerating RA  - encourage incentive spirometry  #Activity  - increase as tolerated    CARDIAC:   #syncopal fall  - no cardiac PMHx  - TTE, 3/9 - EF 58%, mild MR, mild TR, sclerotic aortic valve  - orthostatics pending  - EKG sinus rhythm    GI/NUTR:   #Diet, Regular  - aspiration precautions, HOB 30  #GI Prophylaxis  - not indicated  #Bowel regimen  - senna  - last BM prior to admission    /RENAL:   -Dickson removed, passed TOV  #elevated lactate  - Lactate 3.1 on admission, now downtrending to 1.0  #maintain euvolemia   - IVL  #CK on admission  - 270 > 210  - downtrending    [03-09 @ 20:00] BUN/Cr  18/0.6  -->, [03-10 @ 20:11] BUN/Cr  12/0.7  -->  [03-10 @ 20:11]Na  136 // K  4.4 // Mg  1.8 // Phos  2.7  [03-09 @ 20:00]Na  132 // K  4.1 // Mg  2.0 // Phos  2.6  s/p 2mg Mag and 15 NaPhos    HEME/ONC:   #DVT prophylaxis  - mechanical: SCDs  - holding chemical PPx in the setting of intracranial bleed; can likely start today      Labs: Hb/Hct:  12.0/35.1  (03-09 @ 20:00)  -->,  12.0/37.1  (03-10 @ 20:11)  -->                      Plts:  178  -->,  190  -->                 PTT/INR:        ID:  #monitor for leukocytosis/fever  - UA negative   #MRSA pending  WBC- 6.59  --->>,  5.29  --->>,  5.91  --->>  Temp trend- 24hrs T(F): 97.5 (03-11 @ 00:00), Max: 98.3 (03-10 @ 04:00)    ENDO:  #glycemic monitoring  - Glucose goal 140-180. if above 180, start insulin sliding scale    MSK:  #acute right comminuted femoral greater trochanteric fracture  - ortho consulted > non-operative management, elevation and icing  - RLE WBAT  - PT pending   -Ambulated around unit     DERM:  #DTI screen negative   - continue to monitor daily skin changes     LINES/DRAINS:  matias CHERRY (3/9)  INDICATION FOR SICU:  q1h neuro checks  DISPO: SICU; Case to be discussed with attending Dr. Frances Assessment and Recommendation:   74F s/p syncopal fall with acute on chronic SDH, acute SAH/IPH, R intertrochanteric femur fx.     NEURO/PSYCH:  #acute on chronic SDH, acute SAH/IPH  - Neurosurgery - no surgical intervention, q4hr neuro checks, keppra 500bid x days   - neuroendovascular consulted > patient is declining. f/u 2 weeks after discharge with repeat CTH and discuss possible MMA embol. Recommending repeat CTH prior to inpatient discharge - if stable follow up w Dr Metz- Head CT #2: stable  - q4h neuro checks  #Acute pain  - APAP prn  - lidocaine patch  #PMHx of anxiety/depression  - continue home escitalopram 20mg QD  - continue home mirtazapine 7.5mg QD   - continue home xanax 0.5mg QD  - continue home QUEtiapine 100mg QHS     RESP:   #Oxygenation  - tolerating RA  - encourage incentive spirometry  #Activity  - increase as tolerated    CARDIAC:   #syncopal fall  - no cardiac PMHx  - TTE, 3/9 - EF 58%, mild MR, mild TR, sclerotic aortic valve  - orthostatics pending  - EKG sinus rhythm    GI/NUTR:   #Diet, Regular  - aspiration precautions, HOB 30  #GI Prophylaxis  - not indicated  #Bowel regimen  - senna  - last BM prior to admission    /RENAL:   -Dickson removed, passed TOV  #elevated lactate  - Lactate 3.1 on admission, now downtrending to 1.0  #maintain euvolemia   - IVL  #CK on admission  - 270 > 210  - downtrending    [03-09 @ 20:00] BUN/Cr  18/0.6  -->, [03-10 @ 20:11] BUN/Cr  12/0.7  -->  [03-10 @ 20:11]Na  136 // K  4.4 // Mg  1.8 // Phos  2.7  [03-09 @ 20:00]Na  132 // K  4.1 // Mg  2.0 // Phos  2.6  s/p 2mg Mag and 15 NaPhos    HEME/ONC:   #DVT prophylaxis  - mechanical: SCDs  - lovenox 40mg SQ Q24H      Labs: Hb/Hct:  12.0/35.1  (03-09 @ 20:00)  -->,  12.0/37.1  (03-10 @ 20:11)  -->                      Plts:  178  -->,  190  -->                 PTT/INR:        ID:  #monitor for leukocytosis/fever  - UA negative   #MRSA pending  WBC- 6.59  --->>,  5.29  --->>,  5.91  --->>  Temp trend- 24hrs T(F): 97.5 (03-11 @ 00:00), Max: 98.3 (03-10 @ 04:00)    ENDO:  #glycemic monitoring  - Glucose goal 140-180. if above 180, start insulin sliding scale    MSK:  #acute right comminuted femoral greater trochanteric fracture  - ortho consulted > non-operative management, elevation and icing  - RLE WBAT  - PT pending   -Ambulated around unit     DERM:  #DTI screen negative   - continue to monitor daily skin changes     LINES/DRAINS:  PIV  INDICATION FOR SICU:  q1h neuro checks  DISPO: SICU; Case to be discussed with attending Dr. Frances Assessment and Recommendation:   74F s/p syncopal fall with acute on chronic SDH, acute SAH/IPH, R intertrochanteric femur fx.     NEURO/PSYCH:  #acute on chronic SDH, acute SAH/IPH  - Neurosurgery - no surgical intervention, q4hr neuro checks, keppra 500bid x 7 days   - neuroendovascular consulted > patient is declining. f/u 2 weeks after discharge with repeat CTH and discuss possible MMA embol. Recommending repeat CTH prior to inpatient discharge - if stable follow up w Dr Metz- Head CT #2: stable  - q4h neuro checks  #Acute pain  - APAP prn  - lidocaine patch  #PMHx of anxiety/depression  - continue home escitalopram 20mg QD  - continue home mirtazapine 7.5mg QD   - continue home xanax 0.5mg QD  - continue home QUEtiapine 100mg QHS     RESP:   #Oxygenation  - tolerating room air  - encourage incentive spirometry  #Activity  - increase as tolerated    CARDIAC:   #syncopal fall  - no cardiac PMHx  - TTE, 3/9 - EF 58%, mild MR, mild TR, sclerotic aortic valve  - orthostatics negative  - EKG sinus rhythm    GI/NUTR:   #Diet, Regular  - aspiration precautions, HOB 30  #GI Prophylaxis  - not indicated  #Bowel regimen  - senna  - last BM 3/10    /RENAL:   -Dickson removed, passed TOV  #elevated lactate  - Lactate 3.1 on admission, now downtrending to 1.0  #maintain euvolemia   - IVL  #CK on admission  - 270 > 210  - downtrending    [03-09 @ 20:00] BUN/Cr  18/0.6  -->, [03-10 @ 20:11] BUN/Cr  12/0.7  -->  [03-10 @ 20:11]Na  136 // K  4.4 // Mg  1.8 // Phos  2.7  [03-09 @ 20:00]Na  132 // K  4.1 // Mg  2.0 // Phos  2.6  s/p 2mg Mag and 15 NaPhos    HEME/ONC:   #DVT prophylaxis  - mechanical: SCDs  - lovenox 40mg SQ Q24H      Labs: Hb/Hct:  12.0/35.1  (03-09 @ 20:00)  -->,  12.0/37.1  (03-10 @ 20:11)  -->                      Plts:  178  -->,  190  -->                 PTT/INR:        ID:  #monitor for leukocytosis/fever  - UA negative   #MRSA negative  WBC- 6.59  --->>,  5.29  --->>,  5.91  --->>  Temp trend- 24hrs T(F): 97.5 (03-11 @ 00:00), Max: 98.3 (03-10 @ 04:00)    ENDO:  #glycemic monitoring  - Glucose goal 140-180. if above 180, start insulin sliding scale    MSK:  #acute right comminuted femoral greater trochanteric fracture  - ortho consulted > non-operative management, elevation and icing  - RLE WBAT  - PT recommended home physical therapy  -Ambulated around unit     DERM:  #DTI screen negative   - continue to monitor daily skin changes     LINES/DRAINS:  PIV  DISPO: MED/SURG v. discharge; Case to be discussed with attending Dr. Frances Assessment and Recommendation:   74F s/p syncopal fall with acute on chronic SDH, acute SAH/IPH, R intertrochanteric femur fx.     NEURO/PSYCH:  #acute on chronic SDH, acute SAH/IPH  - Neurosurgery - no surgical intervention, q4hr neuro checks, keppra 500bid x 7 days   - neuroendovascular consulted > patient is declining. f/u 2 weeks after discharge with repeat CTH and discuss possible MMA embol. Stable CTH#2- 3/8 neuroendovascular stated no indication for repeated CTH prior to inpatient discharge -pt will follow up w Dr Metz  - q4h neuro checks  #Acute pain  - APAP prn  - lidocaine patch  #PMHx of anxiety/depression  - continue home escitalopram 20mg QD  - continue home mirtazapine 7.5mg QD   - continue home xanax 0.5mg QD  - continue home QUEtiapine 100mg QHS     RESP:   #Oxygenation  - tolerating room air  - encourage incentive spirometry  #Activity  - increase as tolerated    CARDIAC:   #syncopal fall  - no cardiac PMHx  - TTE, 3/9 - EF 58%, mild MR, mild TR, sclerotic aortic valve  - orthostatics negative  - EKG sinus rhythm    GI/NUTR:   #Diet, Regular  - aspiration precautions, HOB 30  #GI Prophylaxis  - not indicated  #Bowel regimen  - senna  - last BM 3/10    /RENAL:   -Dickson removed, passed TOV  #elevated lactate  - Lactate 3.1 on admission, now downtrending to 1.0  #maintain euvolemia   - IVL  #CK on admission  - 270 > 210  - downtrending    [03-09 @ 20:00] BUN/Cr  18/0.6  -->, [03-10 @ 20:11] BUN/Cr  12/0.7  -->  [03-10 @ 20:11]Na  136 // K  4.4 // Mg  1.8 // Phos  2.7  [03-09 @ 20:00]Na  132 // K  4.1 // Mg  2.0 // Phos  2.6  s/p 2mg Mag and 15 NaPhos    HEME/ONC:   #DVT prophylaxis  - mechanical: SCDs  - lovenox 40mg SQ Q24H      Labs: Hb/Hct:  12.0/35.1  (03-09 @ 20:00)  -->,  12.0/37.1  (03-10 @ 20:11)  -->                      Plts:  178  -->,  190  -->                 PTT/INR:        ID:  #monitor for leukocytosis/fever  - UA negative   #MRSA negative  WBC- 6.59  --->>,  5.29  --->>,  5.91  --->>  Temp trend- 24hrs T(F): 97.5 (03-11 @ 00:00), Max: 98.3 (03-10 @ 04:00)    ENDO:  #glycemic monitoring  - Glucose goal 140-180. if above 180, start insulin sliding scale    MSK:  #acute right comminuted femoral greater trochanteric fracture  - ortho consulted > non-operative management, elevation and icing  - RLE WBAT  - PT recommended home physical therapy  -Ambulated around unit     DERM:  #DTI screen negative   - continue to monitor daily skin changes     LINES/DRAINS:  PIV  DISPO: MED/SURG v. discharge; Case to be discussed with attending Dr. Frances

## 2025-03-11 NOTE — PROGRESS NOTE ADULT - SUBJECTIVE AND OBJECTIVE BOX
NELSON EUBANKS  74y  [facility] 2[facility] 011 A      Patient is a 74y old  Female who presents with a chief complaint of SDH, SAH, IPH s/p fall, right hip fx (11 Mar 2025 07:12)      INTERVAL HPI/OVERNIGHT EVENTS:        REVIEW OF SYSTEMS:        FAMILY HISTORY:    T(C): 36.3 (03-11-25 @ 04:00), Max: 36.6 (03-10-25 @ 12:00)  HR: 84 (03-11-25 @ 04:00) (84 - 117)  BP: 101/59 (03-11-25 @ 04:00) (98/55 - 135/64)  RR: 18 (03-10-25 @ 19:00) (17 - 19)  SpO2: 100% (03-11-25 @ 04:00) (94% - 100%)  Wt(kg): --Vital Signs Last 24 Hrs  T(C): 36.3 (11 Mar 2025 04:00), Max: 36.6 (10 Mar 2025 12:00)  T(F): 97.3 (11 Mar 2025 04:00), Max: 97.8 (10 Mar 2025 12:00)  HR: 84 (11 Mar 2025 04:00) (84 - 117)  BP: 101/59 (11 Mar 2025 04:00) (98/55 - 135/64)  BP(mean): 78 (11 Mar 2025 04:00) (69 - 97)  RR: 18 (10 Mar 2025 19:00) (17 - 19)  SpO2: 100% (11 Mar 2025 04:00) (94% - 100%)    Parameters below as of 11 Mar 2025 04:00  Patient On (Oxygen Delivery Method): room air        PHYSICAL EXAM:  GENERAL: NAD, well-groomed, well-developed  HEAD:  Atraumatic, Normocephalic  EYES: EOMI, PERRLA, conjunctiva and sclera clear  ENMT: No tonsillar erythema, exudates, or enlargement; Moist mucous membranes, Good dentition, No lesions  NECK: Supple, No JVD, Normal thyroid  NERVOUS SYSTEM:  Alert & Oriented X3, Good concentration; Motor Strength 5/5 B/L upper and lower extremities; DTRs 2+ intact and symmetric  PULM: Clear to auscultation bilaterally  CARDIAC: Regular rate and rhythm; No murmurs, rubs, or gallops  GI: Soft, Nontender, Nondistended; Bowel sounds present  EXTREMITIES:  2+ Peripheral Pulses, No clubbing, cyanosis, or edema  LYMPH: No lymphadenopathy noted  SKIN: No rashes or lesions    Consultant(s) Notes Reviewed:  [x ] YES  [ ] NO  Care Discussed with Consultants/Other Providers [ x] YES  [ ] NO    LABS:                            12.0   5.91  )-----------( 190      ( 10 Mar 2025 20:11 )             37.1   03-10    136  |  98  |  12  ----------------------------<  116[H]  4.4   |  27  |  0.7    Ca    8.6      10 Mar 2025 20:11  Phos  2.7     03-10  Mg     1.8     03-10              acetaminophen     Tablet .. 650 milliGRAM(s) Oral every 6 hours PRN  ALPRAZolam 0.5 milliGRAM(s) Oral every 12 hours  chlorhexidine 2% Cloths 1 Application(s) Topical daily  enoxaparin Injectable 40 milliGRAM(s) SubCutaneous every 24 hours  escitalopram 20 milliGRAM(s) Oral daily  levETIRAcetam 500 milliGRAM(s) Oral every 12 hours  lidocaine   4% Patch 1 Patch Transdermal daily PRN  mirtazapine 7.5 milliGRAM(s) Oral at bedtime  QUEtiapine 100 milliGRAM(s) Oral at bedtime  senna 2 Tablet(s) Oral at bedtime    This is a 74 year old Female, with hx of anxiety and depression, s/p fall with acute on chronic SDH, acute SAH/IPH, R intertrochanteric femur fx.         1. Fall/Syncope complicated by acute on chronic subdural hematoms + SAH + right intertrochanteric femur fracture  - Admit to SICU        -ECG:WNL   - Started on Keppra as per neurosx   - Pain meds prn with laxative   - encourage incentive spirometry  - TTE performed 3/9 - EF 58%, mild MR, mild TR, sclerotic aortic valve  - Head CT #2: stable  - RLE WBAT  - Neurosx consult appreciated  - Neuroendovascular consult:  a)  f/u 2 weeks after discharge with repeat CTH and discuss possible MMA embol.   - ortho consulted > non-operative management, elevation and icing  - PT eval       2. anxiety/depression  - continue home escitalopram 20mg QD  - continue home mirtazapine 7.5mg QD   - continue home xanax 0.5mg BID  - continue home QUEtiapine 100mg QHS     3.Lactic acidosis resolved     4. Mild acute hyponatremia with mild elevation in K resolved   - May check serum osm, TSH and cortisol am for further eval     5. SInus tachycardia seems due to anxiety resolved   * Denies sob and chest pain . No signs of withdrawal. denies drinking alcohol    -Watch for any withdrawal sx     6. DVT prophylaxis  - mechanical: SCDs  - Chemical: Lovenox 40 QD       NELSON EUBANKS  74y  [facility] 2[facility] 011 A      Patient is a 74y old  Female who presents with a chief complaint of SDH, SAH, IPH s/p fall, right hip fx (11 Mar 2025 07:12)      INTERVAL HPI/OVERNIGHT EVENTS:      Patient feels well.   She has no complains   her tachycardia resolved  no overnight events.         FAMILY HISTORY:    T(C): 36.3 (03-11-25 @ 04:00), Max: 36.6 (03-10-25 @ 12:00)  HR: 84 (03-11-25 @ 04:00) (84 - 117)  BP: 101/59 (03-11-25 @ 04:00) (98/55 - 135/64)  RR: 18 (03-10-25 @ 19:00) (17 - 19)  SpO2: 100% (03-11-25 @ 04:00) (94% - 100%)  Wt(kg): --Vital Signs Last 24 Hrs  T(C): 36.3 (11 Mar 2025 04:00), Max: 36.6 (10 Mar 2025 12:00)  T(F): 97.3 (11 Mar 2025 04:00), Max: 97.8 (10 Mar 2025 12:00)  HR: 84 (11 Mar 2025 04:00) (84 - 117)  BP: 101/59 (11 Mar 2025 04:00) (98/55 - 135/64)  BP(mean): 78 (11 Mar 2025 04:00) (69 - 97)  RR: 18 (10 Mar 2025 19:00) (17 - 19)  SpO2: 100% (11 Mar 2025 04:00) (94% - 100%)    Parameters below as of 11 Mar 2025 04:00  Patient On (Oxygen Delivery Method): room air        PHYSICAL EXAM:  GENERAL: Looks comfortable   NERVOUS SYSTEM:  Alert & Oriented X3  PULM: Clear to auscultation bilaterally  CARDIAC: Regular rate and rhythm;  GI: Soft, Nontender, Nondistended; Bowel sounds present  EXTREMITIES:  2+ Peripheral Pulses,    Consultant(s) Notes Reviewed:  [x ] YES  [ ] NO  Care Discussed with Consultants/Other Providers [ x] YES  [ ] NO    LABS:                            12.0   5.91  )-----------( 190      ( 10 Mar 2025 20:11 )             37.1   03-10    136  |  98  |  12  ----------------------------<  116[H]  4.4   |  27  |  0.7    Ca    8.6      10 Mar 2025 20:11  Phos  2.7     03-10  Mg     1.8     03-10              acetaminophen     Tablet .. 650 milliGRAM(s) Oral every 6 hours PRN  ALPRAZolam 0.5 milliGRAM(s) Oral every 12 hours  chlorhexidine 2% Cloths 1 Application(s) Topical daily  enoxaparin Injectable 40 milliGRAM(s) SubCutaneous every 24 hours  escitalopram 20 milliGRAM(s) Oral daily  levETIRAcetam 500 milliGRAM(s) Oral every 12 hours  lidocaine   4% Patch 1 Patch Transdermal daily PRN  mirtazapine 7.5 milliGRAM(s) Oral at bedtime  QUEtiapine 100 milliGRAM(s) Oral at bedtime  senna 2 Tablet(s) Oral at bedtime    This is a 74 year old Female, with hx of anxiety and depression, s/p fall with acute on chronic SDH, acute SAH/IPH, R intertrochanteric femur fx.         1. Fall/Syncope complicated by acute on chronic subdural hematoms + SAH + right intertrochanteric femur fracture  - Admit to SICU        -ECG:WNL   - Started on Keppra as per neurosx   - Pain meds prn with laxative   - encourage incentive spirometry  - TTE performed 3/9 - EF 58%, mild MR, mild TR, sclerotic aortic valve  - Head CT #2: stable  - RLE WBAT  - Neurosx consult appreciated  - Neuroendovascular consult:  a)  f/u 2 weeks after discharge with repeat CTH and discuss possible MMA embol.   - ortho consulted > non-operative management, elevation and icing  - PT eval       2. anxiety/depression  - continue home escitalopram 20mg QD  - continue home mirtazapine 7.5mg QD   - continue home xanax 0.5mg BID  - continue home QUEtiapine 100mg QHS     3.Lactic acidosis resolved     4. Mild acute hyponatremia with mild elevation in K resolved   - May check serum osm, TSH and cortisol am for further eval     5. SInus tachycardia seems due to anxiety resolved   * Denies sob and chest pain . No signs of withdrawal. denies drinking alcohol    -Watch for any withdrawal sx     6. DVT prophylaxis  - mechanical: SCDs  - Chemical: Lovenox 40 QD

## 2025-03-11 NOTE — CONSULT NOTE ADULT - ASSESSMENT
IMPRESSION: Rehab of multitrauma / s/p fall / syncope with + head trauma, + LOC,  acute on chronic SDH, acute SAH/IPH, R greatertrochanteric femur fx / anxiety and depression    PRECAUTIONS: [   ] Cardiac  [   ] Respiratory  [   ] Seizures [   ] Contact Isolation  [   ] Droplet Isolation  [   ] Other    Weight Bearing Status: WBAT RLE    RECOMMENDATION:    Out of Bed to Chair     DVT/Decubiti Prophylaxis    REHAB PLAN:     [ x   ] Bedside P/T 3-5 times a week   [    ]   Bedside O/T  2-3 times a week             [    ] Speech Therapy               [    ]  No Rehab Therapy Indicated   Conditioning/ROM                                    ADL  Bed Mobility                                               Conditioning/ROM  Transfers                                                     Bed Mobility  Sitting /Standing Balance                         Transfers                                        Gait Training                                               Sitting/Standing Balance  Stair Training [   ]Applicable                    Home equipment Eval                                                                        Splinting  [   ] Only      GOALS:   ADL   [    ]   Independent                    Transfers  [   x ] Independent                          Ambulation  [  x  ] Independent     [  x   ] With device                            [    ]  CG                                                         [    ]  CG                                                                  [    ] CG                            [    ] Min A                                                   [    ] Min A                                                              [    ] Min  A          DISCHARGE PLAN:   [    ]  Good candidate for Intensive Rehabilitation/Hospital based                                             Will tolerate 3hrs Intensive Rehab Daily                                       [   x  ]  Short Term Rehab in Skilled Nursing Facility                           vs            [   x  ]  Home with Outpatient or VN services                                         [     ]  Possible Candidate for Intensive Hospital based Rehab

## 2025-03-11 NOTE — CONSULT NOTE ADULT - CONSULT REQUESTED DATE/TIME
08-Mar-2025 18:49
08-Mar-2025 20:45
11-Mar-2025 12:18
10-Mar-2025 08:31
10-Mar-2025 13:24
08-Mar-2025 18:58

## 2025-03-11 NOTE — DISCHARGE NOTE PROVIDER - CARE PROVIDER_API CALL
Brad Metz  Interventional Neuroradiology  [address] [place] [address], [address]  [place] [address]  Phone: [phone]  Fax: [phone]  Follow Up Time: 2 weeks    Mio Sexton  Orthopaedic Surgery  [address] [place] [id number]  Phone: [phone]  Fax: [phone]  Follow Up Time: 1 week

## 2025-03-11 NOTE — DISCHARGE NOTE PROVIDER - NSDCCPCAREPLAN_GEN_ALL_CORE_FT
PRINCIPAL DISCHARGE DIAGNOSIS  Diagnosis: Subarachnoid hemorrhage  Assessment and Plan of Treatment: You have sustained an acute right subarachnoid hemorrhage/ intraparenchymal hematoma, and a subdural hematoma also known as a head bleed, after suffering a fall. Since being admitted, your CT scans show the bleed has become stable.  SURGERY DISCHARGE INSTRUCTIONS  FOLLOW-UP - with  Dr. Metz in 2 weeks with a repeat CT of the head.  Dr. Sexton in 1 week for follow up of your greater trochanteric fracture  Call the office to make an appointment or if you have any questions/concerns.  DIET - regular.   ACTIVITY- Please cotninue to work with physican therapy. Weight bearing as tolerated.  PAIN MEDS - Take over the counter extra strength tylenol 650mg every 6 hours for pain.  OTHER MEDS - If you have any questions about your other regular home medications please call your primary care physician or the physician who prescribed those medications to you.   If you develop fever, dizziness, chest pain, trouble breathing, nausea, vomiting, increasing abdominal pain, inability to pass bowel movements, redness/pain/discharge from incisions. Please call the office or go to the emergency room immediately.        SECONDARY DISCHARGE DIAGNOSES  Diagnosis: Greater trochanter fracture  Assessment and Plan of Treatment: Please follow up with Orthopedics next week.     PRINCIPAL DISCHARGE DIAGNOSIS  Diagnosis: Subarachnoid hemorrhage  Assessment and Plan of Treatment: You have sustained an acute right subarachnoid hemorrhage/ intraparenchymal hematoma, and a subdural hematoma also known as a head bleed, after suffering a fall. Since being admitted, your CT scans show the bleed has become stable.  SURGERY DISCHARGE INSTRUCTIONS  FOLLOW-UP - with  Dr. Metz in 2 weeks with a repeat CT of the head.  Dr. Sexton in 1 week for follow up of your greater trochanteric fracture  Call the office to make an appointment or if you have any questions/concerns.  DIET - regular.   ACTIVITY- Please continue to work with physical therapy. Weight bearing as tolerated. You will be given a rolling walker on discharge.   PAIN MEDS - Take over the counter extra strength tylenol 650mg every 6 hours for pain.  OTHER MEDS - If you have any questions about your other regular home medications please call your primary care physician or the physician who prescribed those medications to you.   If you develop fever, dizziness, loss of consciousness, trouble breathing, nausea, vomiting, increasing abdominal pain, ease call the office or go to the emergency room immediately.        SECONDARY DISCHARGE DIAGNOSES  Diagnosis: Greater trochanter fracture  Assessment and Plan of Treatment: Please follow up with Orthopedics next week.

## 2025-03-11 NOTE — DISCHARGE NOTE NURSING/CASE MANAGEMENT/SOCIAL WORK - FINANCIAL ASSISTANCE
[facility] provides services at a reduced cost to those who are determined to be eligible through [facility]’s financial assistance program. Information regarding [facility]’s financial assistance program can be found by going to [email or web address] or by calling [phone].

## 2025-03-11 NOTE — PROGRESS NOTE ADULT - SUBJECTIVE AND OBJECTIVE BOX
NELSON EUBANKS   [id number]   [birth year]    74yF  ============================================================   DATE OF INITIAL SICU/SDU CONSULT: 03-08-25    INDICATION FOR SICU CONSULT:       SICU COURSE EVENTS :  03-08 - admitted to SICU service  3/9: Q4 neuro checks  3/10 Salcedo removed, passed TOV, neuro IR recs, DG, pending labs, Phos, Mg repletions     24HOUR EVENTS  3/10  NIGHT   -PM rounds: GCS 15, SBP 110s, NSR HR 90s, saturating 98% on RA, R hip pain controlled, OOBTC/ambulating around unit today   -20:00 labs   -passed TOV   -15 Naphos, 2g magnesium   -D/G, WFB     DAY  - remove salcedo  - per neuroIR patient deferring inpatient MMA embol, would prefer outpatient f/u. recommending repeat CTH noncon prior to discharge   -q4h neurochecks  - DG to 4C    [X] A ten-point review of systems was negative except as expressed in note.  [X] History was obtained from patient. If unable to participate in their care, history obtained from review of the chart and collateral sources of information.  ============================================================    NELSON EUBANKS   [id number]   [birth year]    74yF  ============================================================   DATE OF INITIAL SICU/SDU CONSULT: 03-08-25    INDICATION FOR SICU CONSULT:       SICU COURSE EVENTS :  03-08 - admitted to SICU service  3/9: Q4 neuro checks  3/10 Salcedo removed, passed TOV, neuro IR recs, DG, pending labs, Phos, Mg repletions     24HOUR EVENTS  3/10  NIGHT   -PM rounds: GCS 15, SBP 110s, NSR HR 90s, saturating 98% on RA, R hip pain controlled, OOBTC/ambulating around unit today   -20:00 labs   -passed TOV   -15 Naphos, 2g magnesium   -D/G, WFB     DAY  - remove salcedo  - per neuroIR patient deferring inpatient MMA embol, would prefer outpatient f/u. recommending repeat CTH noncon prior to discharge   -q4h neurochecks  - DG to 4C    [X] A ten-point review of systems was negative except as expressed in note.  [X] History was obtained from patient. If unable to participate in their care, history obtained from review of the chart and collateral sources of information.  ============================================================     ACTIVE MEDICATIONS  acetaminophen     Tablet .. 650 milliGRAM(s) Oral every 6 hours PRN Mild Pain (1 - 3)  ALPRAZolam 0.5 milliGRAM(s) Oral every 12 hours  chlorhexidine 2% Cloths 1 Application(s) Topical daily  enoxaparin Injectable 40 milliGRAM(s) SubCutaneous every 24 hours  escitalopram 20 milliGRAM(s) Oral daily  levETIRAcetam 500 milliGRAM(s) Oral every 12 hours  lidocaine   4% Patch 1 Patch Transdermal daily PRN hip pain  mirtazapine 7.5 milliGRAM(s) Oral at bedtime  QUEtiapine 100 milliGRAM(s) Oral at bedtime  senna 2 Tablet(s) Oral at bedtime     VITALS LAST 24 HOURS   T(F): 97.3 (03-11 @ 04:00), Max: 97.8 (03-10 @ 12:00)  HR: 84 (03-11 @ 04:00) (84 - 117)  BP: 101/59 (03-11 @ 04:00) (98/55 - 135/64)  BP(mean): 78 (03-11 @ 04:00) (69 - 91)  ABP: --  ABP(mean): --  RR: 18 (03-10 @ 19:00) (17 - 19)  SpO2: 100% (03-11 @ 04:00) (94% - 100%)  FLUID STATUS    03-10-25 @ 07:01  -  03-11-25 @ 07:00  --------------------------------------------------------  IN:    IV PiggyBack: 50 mL    IV PiggyBack: 250 mL  Total IN: 300 mL    OUT:    Indwelling Catheter - Urethral (mL): 500 mL    Voided (mL): 250 mL  Total OUT: 750 mL    Total NET: -450 mL        MECHANICAL VENT/BLOOD GAS       PHYSICAL EXAM:    in no acute distress    Neuro  a&ox3, GCS, follows commands    Resp  On room air breathing comfortably, equal chest rise b/l    Cardio  s1, s1. RRR    Abdomen  non-distended, abdomen soft, non-tender    extremities soft, radial, DP pules 2+, strength intact in UE/LE b/l             NELSON EUBANKS   [id number]   [birth year]    74yF  ============================================================   DATE OF INITIAL SICU/SDU CONSULT: 03-08-25    INDICATION FOR SICU CONSULT:       SICU COURSE EVENTS :  03-08 - admitted to SICU service  3/9: Q4 neuro checks  3/10 Salcedo removed, passed TOV, neuro IR recs, DG, pending labs, Phos, Mg repletions     24HOUR EVENTS  3/10  NIGHT   -PM rounds: GCS 15, SBP 110s, NSR HR 90s, saturating 98% on RA, R hip pain controlled, OOBTC/ambulating around unit today   -20:00 labs   -passed TOV   -15 Naphos, 2g magnesium   -D/G, WFB     DAY  - remove salcedo  - per neuroIR patient deferring inpatient MMA embol, would prefer outpatient f/u. recommending repeat CTH noncon prior to discharge   -q4h neurochecks  - DG to 4C    [X] A ten-point review of systems was negative except as expressed in note.  [X] History was obtained from patient. If unable to participate in their care, history obtained from review of the chart and collateral sources of information.  ============================================================     ACTIVE MEDICATIONS  acetaminophen     Tablet .. 650 milliGRAM(s) Oral every 6 hours PRN Mild Pain (1 - 3)  ALPRAZolam 0.5 milliGRAM(s) Oral every 12 hours  chlorhexidine 2% Cloths 1 Application(s) Topical daily  enoxaparin Injectable 40 milliGRAM(s) SubCutaneous every 24 hours  escitalopram 20 milliGRAM(s) Oral daily  levETIRAcetam 500 milliGRAM(s) Oral every 12 hours  lidocaine   4% Patch 1 Patch Transdermal daily PRN hip pain  mirtazapine 7.5 milliGRAM(s) Oral at bedtime  QUEtiapine 100 milliGRAM(s) Oral at bedtime  senna 2 Tablet(s) Oral at bedtime     VITALS LAST 24 HOURS   T(F): 97.3 (03-11 @ 04:00), Max: 97.8 (03-10 @ 12:00)  HR: 84 (03-11 @ 04:00) (84 - 117)  BP: 101/59 (03-11 @ 04:00) (98/55 - 135/64)  BP(mean): 78 (03-11 @ 04:00) (69 - 91)  ABP: --  ABP(mean): --  RR: 18 (03-10 @ 19:00) (17 - 19)  SpO2: 100% (03-11 @ 04:00) (94% - 100%)  FLUID STATUS    03-10-25 @ 07:01  -  03-11-25 @ 07:00  --------------------------------------------------------  IN:    IV PiggyBack: 50 mL    IV PiggyBack: 250 mL  Total IN: 300 mL    OUT:    Indwelling Catheter - Urethral (mL): 500 mL    Voided (mL): 250 mL  Total OUT: 750 mL    Total NET: -450 mL        MECHANICAL VENT/BLOOD GAS       PHYSICAL EXAM:    in no acute distress    Neuro  a&ox3, GCS, follows commands, PERRLA    Resp  On room air breathing comfortably, equal chest rise b/l    Cardio  s1, s1. RRR    Abdomen  non-distended, abdomen soft, non-tender    extremities soft, radial, DP pules 2+ b/l, strength intact in UE/LE b/l             NELSON EUBANKS   [id number]   [birth year]    74yF  ============================================================   DATE OF INITIAL SICU/SDU CONSULT: 03-08-25    INDICATION FOR SICU CONSULT:       SICU COURSE EVENTS :  03-08 - admitted to SICU service  3/9: Q4 neuro checks  3/10 Salcedo removed, passed TOV, neuro IR recs, DG, pending labs, Phos, Mg repletions     24HOUR EVENTS  3/10  NIGHT   -PM rounds: GCS 15, SBP 110s, NSR HR 90s, saturating 98% on RA, R hip pain controlled, OOBTC/ambulating around unit today   -20:00 labs   -passed TOV   -15 Naphos, 2g magnesium   -D/G, WFB     DAY  - remove salcedo  - per neuroIR patient deferring inpatient MMA embol, would prefer outpatient f/u. recommending repeat CTH noncon prior to discharge   -q4h neurochecks  - DG to 4C    [X] A ten-point review of systems was negative except as expressed in note.  [X] History was obtained from patient. If unable to participate in their care, history obtained from review of the chart and collateral sources of information.  ============================================================     ACTIVE MEDICATIONS  acetaminophen     Tablet .. 650 milliGRAM(s) Oral every 6 hours PRN Mild Pain (1 - 3)  ALPRAZolam 0.5 milliGRAM(s) Oral every 12 hours  chlorhexidine 2% Cloths 1 Application(s) Topical daily  enoxaparin Injectable 40 milliGRAM(s) SubCutaneous every 24 hours  escitalopram 20 milliGRAM(s) Oral daily  levETIRAcetam 500 milliGRAM(s) Oral every 12 hours  lidocaine   4% Patch 1 Patch Transdermal daily PRN hip pain  mirtazapine 7.5 milliGRAM(s) Oral at bedtime  QUEtiapine 100 milliGRAM(s) Oral at bedtime  senna 2 Tablet(s) Oral at bedtime     VITALS LAST 24 HOURS   T(F): 97.3 (03-11 @ 04:00), Max: 97.8 (03-10 @ 12:00)  HR: 84 (03-11 @ 04:00) (84 - 117)  BP: 101/59 (03-11 @ 04:00) (98/55 - 135/64)  BP(mean): 78 (03-11 @ 04:00) (69 - 91)  ABP: --  ABP(mean): --  RR: 18 (03-10 @ 19:00) (17 - 19)  SpO2: 100% (03-11 @ 04:00) (94% - 100%)  FLUID STATUS    03-10-25 @ 07:01  -  03-11-25 @ 07:00  --------------------------------------------------------  IN:    IV PiggyBack: 50 mL    IV PiggyBack: 250 mL  Total IN: 300 mL    OUT:    Indwelling Catheter - Urethral (mL): 500 mL    Voided (mL): 250 mL  Total OUT: 750 mL    Total NET: -450 mL        MECHANICAL VENT/BLOOD GAS       PHYSICAL EXAM:    in no acute distress    Neuro  a&ox3, GCS, follows commands, strenght     Resp  On room air breathing comfortably, equal chest rise b/l    Cardio  s1, s1. RRR    Abdomen  non-distended, abdomen soft, non-tender    extremities soft, radial, DP pules 2+ b/l, strength intact in UE/LE b/l             NELSON EUBANKS   [id number]   [birth year]    74yF  ============================================================   DATE OF INITIAL SICU/SDU CONSULT: 03-08-25    INDICATION FOR SICU CONSULT:       SICU COURSE EVENTS :  03-08 - admitted to SICU service  3/9: Q4 neuro checks  3/10 Salcedo removed, passed TOV, neuro IR recs, DG, pending labs, Phos, Mg repletions     24HOUR EVENTS  3/10  NIGHT   -PM rounds: GCS 15, SBP 110s, NSR HR 90s, saturating 98% on RA, R hip pain controlled, OOBTC/ambulating around unit today   -20:00 labs   -passed TOV   -15 Naphos, 2g magnesium   -D/G, WFB     DAY  - remove salcedo  - per neuroIR patient deferring inpatient MMA embol, would prefer outpatient f/u. recommending repeat CTH noncon prior to discharge   -q4h neurochecks  - DG to 4C    [X] A ten-point review of systems was negative except as expressed in note.  [X] History was obtained from patient. If unable to participate in their care, history obtained from review of the chart and collateral sources of information.  ============================================================     ACTIVE MEDICATIONS  acetaminophen     Tablet .. 650 milliGRAM(s) Oral every 6 hours PRN Mild Pain (1 - 3)  ALPRAZolam 0.5 milliGRAM(s) Oral every 12 hours  chlorhexidine 2% Cloths 1 Application(s) Topical daily  enoxaparin Injectable 40 milliGRAM(s) SubCutaneous every 24 hours  escitalopram 20 milliGRAM(s) Oral daily  levETIRAcetam 500 milliGRAM(s) Oral every 12 hours  lidocaine   4% Patch 1 Patch Transdermal daily PRN hip pain  mirtazapine 7.5 milliGRAM(s) Oral at bedtime  QUEtiapine 100 milliGRAM(s) Oral at bedtime  senna 2 Tablet(s) Oral at bedtime     VITALS LAST 24 HOURS   T(F): 97.3 (03-11 @ 04:00), Max: 97.8 (03-10 @ 12:00)  HR: 84 (03-11 @ 04:00) (84 - 117)  BP: 101/59 (03-11 @ 04:00) (98/55 - 135/64)  BP(mean): 78 (03-11 @ 04:00) (69 - 91)  ABP: --  ABP(mean): --  RR: 18 (03-10 @ 19:00) (17 - 19)  SpO2: 100% (03-11 @ 04:00) (94% - 100%)  FLUID STATUS    03-10-25 @ 07:01  -  03-11-25 @ 07:00  --------------------------------------------------------  IN:    IV PiggyBack: 50 mL    IV PiggyBack: 250 mL  Total IN: 300 mL    OUT:    Indwelling Catheter - Urethral (mL): 500 mL    Voided (mL): 250 mL  Total OUT: 750 mL    Total NET: -450 mL        MECHANICAL VENT/BLOOD GAS       PHYSICAL EXAM:    in no acute distress    Neuro  a&ox3, GCS, follows commands, strength 5/5 UE/LE B/L     Resp  On room air breathing comfortably, equal chest rise b/l    Cardio  s1, s1. RRR    Abdomen  non-distended, abdomen soft, non-tender    extremities   soft, radial, DP pules 2+ b/l, strength intact in UE/LE b/l

## 2025-03-11 NOTE — CHART NOTE - NSCHARTNOTEFT_GEN_A_CORE
Neuroendovascular team consulted for evaluation for a possible MMA embolization in the setting of recent fall and left chronic SDH.   Patient on consult yesterday was reluctant to undergo inpatient endovascular procedure - initial recommendation CTH prior to discharge, however repeat CTH recently 3/8 stable and patient is cleared per SICU team for d/c this AM.   At this time, will follow as an outpatient in 1 week with Dr Isaías Sheppard with repeat CTH at the time of follow up. Office already aware and scheduling, will call patient after discharge.   Patient is not being discharged on AC/AP.   Discussed with SICU team.   [phone]

## 2025-03-11 NOTE — DISCHARGE NOTE PROVIDER - PROVIDER TOKENS
PROVIDER:[TOKEN:[[id number]],FOLLOWUP:[2 weeks]],PROVIDER:[TOKEN:[[id number]],FOLLOWUP:[1 week]]

## 2025-03-11 NOTE — PROGRESS NOTE ADULT - REASON FOR ADMISSION
SDH, SAH, IPH s/p fall, right hip fx

## 2025-03-11 NOTE — CONSULT NOTE ADULT - REASON FOR ADMISSION
SDH, SAH, IPH s/p fall, right hip fx

## 2025-03-12 PROBLEM — F41.9 ANXIETY DISORDER, UNSPECIFIED: Chronic | Status: ACTIVE | Noted: 2025-03-08

## 2025-03-14 DIAGNOSIS — I62.00 NONTRAUMATIC SUBDURAL HEMORRHAGE, UNSPECIFIED: ICD-10-CM

## 2025-03-14 PROBLEM — F32.9 MAJOR DEPRESSIVE DISORDER, SINGLE EPISODE, UNSPECIFIED: Chronic | Status: ACTIVE | Noted: 2025-03-08

## 2025-03-18 DIAGNOSIS — S06.9XAA UNSPECIFIED INTRACRANIAL INJURY WITH LOSS OF CONSCIOUSNESS STATUS UNKNOWN, INITIAL ENCOUNTER: ICD-10-CM

## 2025-03-18 DIAGNOSIS — S72.111A DISPLACED FRACTURE OF GREATER TROCHANTER OF RIGHT FEMUR, INITIAL ENCOUNTER FOR CLOSED FRACTURE: ICD-10-CM

## 2025-03-18 DIAGNOSIS — I08.1 RHEUMATIC DISORDERS OF BOTH MITRAL AND TRICUSPID VALVES: ICD-10-CM

## 2025-03-18 DIAGNOSIS — S06.A0XA TRAUMATIC BRAIN COMPRESSION WITHOUT HERNIATION, INITIAL ENCOUNTER: ICD-10-CM
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
## 2025-03-18 DIAGNOSIS — S06.6X9A TRAUMATIC SUBARACHNOID HEMORRHAGE WITH LOSS OF CONSCIOUSNESS OF UNSPECIFIED DURATION, INITIAL ENCOUNTER: ICD-10-CM

## 2025-03-18 DIAGNOSIS — F32.A DEPRESSION, UNSPECIFIED: ICD-10-CM

## 2025-03-18 DIAGNOSIS — R55 SYNCOPE AND COLLAPSE: ICD-10-CM

## 2025-03-18 DIAGNOSIS — E87.20 ACIDOSIS, UNSPECIFIED: ICD-10-CM

## 2025-03-18 DIAGNOSIS — S06.5X9A TRAUMATIC SUBDURAL HEMORRHAGE WITH LOSS OF CONSCIOUSNESS OF UNSPECIFIED DURATION, INITIAL ENCOUNTER: ICD-10-CM

## 2025-03-18 DIAGNOSIS — E83.42 HYPOMAGNESEMIA: ICD-10-CM

## 2025-03-18 DIAGNOSIS — E87.1 HYPO-OSMOLALITY AND HYPONATREMIA: ICD-10-CM
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
## 2025-03-19 ENCOUNTER — APPOINTMENT (OUTPATIENT)
Dept: ORTHOPEDIC SURGERY | Facility: CLINIC | Age: 75
End: 2025-03-19
Payer: MEDICARE

## 2025-03-19 DIAGNOSIS — S72.114A NONDISPLACED FRACTURE OF GREATER TROCHANTER OF RIGHT FEMUR, INITIAL ENCOUNTER FOR CLOSED FRACTURE: ICD-10-CM

## 2025-03-19 PROCEDURE — 99203 OFFICE O/P NEW LOW 30 MIN: CPT

## 2025-03-19 PROCEDURE — 73502 X-RAY EXAM HIP UNI 2-3 VIEWS: CPT

## 2025-03-25 ENCOUNTER — APPOINTMENT (OUTPATIENT)
Dept: NEUROSURGERY | Facility: CLINIC | Age: 75
End: 2025-03-25
Payer: MEDICARE

## 2025-03-25 ENCOUNTER — NON-APPOINTMENT (OUTPATIENT)
Age: 75
End: 2025-03-25

## 2025-03-25 DIAGNOSIS — Z83.3 FAMILY HISTORY OF DIABETES MELLITUS: ICD-10-CM

## 2025-03-25 DIAGNOSIS — Z78.9 OTHER SPECIFIED HEALTH STATUS: ICD-10-CM

## 2025-03-25 DIAGNOSIS — Z80.9 FAMILY HISTORY OF MALIGNANT NEOPLASM, UNSPECIFIED: ICD-10-CM

## 2025-03-25 DIAGNOSIS — Z82.49 FAMILY HISTORY OF ISCHEMIC HEART DISEASE AND OTHER DISEASES OF THE CIRCULATORY SYSTEM: ICD-10-CM

## 2025-03-25 DIAGNOSIS — I62.00 NONTRAUMATIC SUBDURAL HEMORRHAGE, UNSPECIFIED: ICD-10-CM

## 2025-03-25 PROCEDURE — 99203 OFFICE O/P NEW LOW 30 MIN: CPT

## 2025-03-25 RX ORDER — QUETIAPINE 25 MG/1
25 TABLET, FILM COATED ORAL
Refills: 0 | Status: ACTIVE | COMMUNITY

## 2025-03-25 RX ORDER — MIRTAZAPINE 15 MG/1
15 TABLET, FILM COATED ORAL
Refills: 0 | Status: ACTIVE | COMMUNITY

## 2025-03-25 RX ORDER — ALPRAZOLAM 0.5 MG/1
0.5 TABLET ORAL
Refills: 0 | Status: ACTIVE | COMMUNITY

## 2025-03-25 RX ORDER — ESCITALOPRAM OXALATE 20 MG/1
20 TABLET, FILM COATED ORAL
Refills: 0 | Status: ACTIVE | COMMUNITY

## 2025-04-01 ENCOUNTER — APPOINTMENT (OUTPATIENT)
Dept: ORTHOPEDIC SURGERY | Facility: CLINIC | Age: 75
End: 2025-04-01

## 2025-04-01 DIAGNOSIS — S72.114A NONDISPLACED FRACTURE OF GREATER TROCHANTER OF RIGHT FEMUR, INITIAL ENCOUNTER FOR CLOSED FRACTURE: ICD-10-CM

## 2025-04-01 PROCEDURE — 73502 X-RAY EXAM HIP UNI 2-3 VIEWS: CPT

## 2025-04-01 PROCEDURE — 99204 OFFICE O/P NEW MOD 45 MIN: CPT | Mod: 25

## 2025-04-01 PROCEDURE — 27246 TREAT THIGH FRACTURE: CPT | Mod: RT

## 2025-04-03 ENCOUNTER — OUTPATIENT (OUTPATIENT)
Dept: OUTPATIENT SERVICES | Facility: HOSPITAL | Age: 75
LOS: 1 days | End: 2025-04-03
Payer: MEDICARE

## 2025-04-03 ENCOUNTER — RESULT REVIEW (OUTPATIENT)
Age: 75
End: 2025-04-03

## 2025-04-03 DIAGNOSIS — I62.00 NONTRAUMATIC SUBDURAL HEMORRHAGE, UNSPECIFIED: ICD-10-CM

## 2025-04-03 DIAGNOSIS — Z00.8 ENCOUNTER FOR OTHER GENERAL EXAMINATION: ICD-10-CM

## 2025-04-03 PROCEDURE — 70450 CT HEAD/BRAIN W/O DYE: CPT

## 2025-04-03 PROCEDURE — 70450 CT HEAD/BRAIN W/O DYE: CPT | Mod: 26

## 2025-04-04 DIAGNOSIS — I62.00 NONTRAUMATIC SUBDURAL HEMORRHAGE, UNSPECIFIED: ICD-10-CM

## 2025-04-09 ENCOUNTER — APPOINTMENT (OUTPATIENT)
Dept: NEUROLOGY | Facility: CLINIC | Age: 75
End: 2025-04-09
Payer: MEDICARE

## 2025-04-09 VITALS
DIASTOLIC BLOOD PRESSURE: 78 MMHG | HEART RATE: 102 BPM | SYSTOLIC BLOOD PRESSURE: 116 MMHG | WEIGHT: 100 LBS | HEIGHT: 66 IN | BODY MASS INDEX: 16.07 KG/M2 | OXYGEN SATURATION: 100 %

## 2025-04-09 DIAGNOSIS — I62.00 NONTRAUMATIC SUBDURAL HEMORRHAGE, UNSPECIFIED: ICD-10-CM

## 2025-04-09 PROCEDURE — 99205 OFFICE O/P NEW HI 60 MIN: CPT

## 2025-05-25 ENCOUNTER — INPATIENT (INPATIENT)
Facility: HOSPITAL | Age: 75
LOS: 0 days | Discharge: HOME | DRG: 641 | End: 2025-05-26
Attending: HOSPITALIST | Admitting: STUDENT IN AN ORGANIZED HEALTH CARE EDUCATION/TRAINING PROGRAM
Payer: MEDICARE

## 2025-05-25 VITALS
HEART RATE: 84 BPM | WEIGHT: 105.6 LBS | RESPIRATION RATE: 18 BRPM | TEMPERATURE: 97 F | SYSTOLIC BLOOD PRESSURE: 159 MMHG | OXYGEN SATURATION: 98 % | DIASTOLIC BLOOD PRESSURE: 84 MMHG

## 2025-05-25 DIAGNOSIS — E87.1 HYPO-OSMOLALITY AND HYPONATREMIA: ICD-10-CM

## 2025-05-25 LAB
ALBUMIN SERPL ELPH-MCNC: 4.5 G/DL — SIGNIFICANT CHANGE UP (ref 3.5–5.2)
ALP SERPL-CCNC: 97 U/L — SIGNIFICANT CHANGE UP (ref 30–115)
ALT FLD-CCNC: 10 U/L — SIGNIFICANT CHANGE UP (ref 0–41)
ANION GAP SERPL CALC-SCNC: 13 MMOL/L — SIGNIFICANT CHANGE UP (ref 7–14)
ANION GAP SERPL CALC-SCNC: 9 MMOL/L — SIGNIFICANT CHANGE UP (ref 7–14)
APPEARANCE UR: CLEAR — SIGNIFICANT CHANGE UP
APTT BLD: 28.3 SEC — SIGNIFICANT CHANGE UP (ref 27–39.2)
AST SERPL-CCNC: 23 U/L — SIGNIFICANT CHANGE UP (ref 0–41)
BASOPHILS # BLD AUTO: 0.03 K/UL — SIGNIFICANT CHANGE UP (ref 0–0.2)
BASOPHILS NFR BLD AUTO: 0.4 % — SIGNIFICANT CHANGE UP (ref 0–1)
BILIRUB SERPL-MCNC: 1.2 MG/DL — SIGNIFICANT CHANGE UP (ref 0.2–1.2)
BILIRUB UR-MCNC: NEGATIVE — SIGNIFICANT CHANGE UP
BUN SERPL-MCNC: 11 MG/DL — SIGNIFICANT CHANGE UP (ref 10–20)
BUN SERPL-MCNC: 7 MG/DL — LOW (ref 10–20)
CALCIUM SERPL-MCNC: 8.7 MG/DL — SIGNIFICANT CHANGE UP (ref 8.4–10.5)
CALCIUM SERPL-MCNC: 9.3 MG/DL — SIGNIFICANT CHANGE UP (ref 8.4–10.5)
CHLORIDE SERPL-SCNC: 107 MMOL/L — SIGNIFICANT CHANGE UP (ref 98–110)
CHLORIDE SERPL-SCNC: 89 MMOL/L — LOW (ref 98–110)
CO2 SERPL-SCNC: 22 MMOL/L — SIGNIFICANT CHANGE UP (ref 17–32)
CO2 SERPL-SCNC: 23 MMOL/L — SIGNIFICANT CHANGE UP (ref 17–32)
COLOR SPEC: YELLOW — SIGNIFICANT CHANGE UP
CREAT ?TM UR-MCNC: 12 MG/DL — SIGNIFICANT CHANGE UP
CREAT SERPL-MCNC: 0.6 MG/DL — LOW (ref 0.7–1.5)
CREAT SERPL-MCNC: 0.7 MG/DL — SIGNIFICANT CHANGE UP (ref 0.7–1.5)
DIFF PNL FLD: NEGATIVE — SIGNIFICANT CHANGE UP
EGFR: 90 ML/MIN/1.73M2 — SIGNIFICANT CHANGE UP
EGFR: 90 ML/MIN/1.73M2 — SIGNIFICANT CHANGE UP
EGFR: 94 ML/MIN/1.73M2 — SIGNIFICANT CHANGE UP
EGFR: 94 ML/MIN/1.73M2 — SIGNIFICANT CHANGE UP
EOSINOPHIL # BLD AUTO: 0 K/UL — SIGNIFICANT CHANGE UP (ref 0–0.7)
EOSINOPHIL NFR BLD AUTO: 0 % — SIGNIFICANT CHANGE UP (ref 0–8)
GLUCOSE SERPL-MCNC: 107 MG/DL — HIGH (ref 70–99)
GLUCOSE SERPL-MCNC: 95 MG/DL — SIGNIFICANT CHANGE UP (ref 70–99)
GLUCOSE UR QL: NEGATIVE MG/DL — SIGNIFICANT CHANGE UP
HCT VFR BLD CALC: 36.6 % — LOW (ref 37–47)
HGB BLD-MCNC: 12.6 G/DL — SIGNIFICANT CHANGE UP (ref 12–16)
IMM GRANULOCYTES NFR BLD AUTO: 0.3 % — SIGNIFICANT CHANGE UP (ref 0.1–0.3)
INR BLD: 0.94 RATIO — SIGNIFICANT CHANGE UP (ref 0.65–1.3)
KETONES UR QL: 15 MG/DL
LEUKOCYTE ESTERASE UR-ACNC: NEGATIVE — SIGNIFICANT CHANGE UP
LYMPHOCYTES # BLD AUTO: 0.63 K/UL — LOW (ref 1.2–3.4)
LYMPHOCYTES # BLD AUTO: 9 % — LOW (ref 20.5–51.1)
MCHC RBC-ENTMCNC: 29.4 PG — SIGNIFICANT CHANGE UP (ref 27–31)
MCHC RBC-ENTMCNC: 34.4 G/DL — SIGNIFICANT CHANGE UP (ref 32–37)
MCV RBC AUTO: 85.3 FL — SIGNIFICANT CHANGE UP (ref 81–99)
MONOCYTES # BLD AUTO: 0.29 K/UL — SIGNIFICANT CHANGE UP (ref 0.1–0.6)
MONOCYTES NFR BLD AUTO: 4.1 % — SIGNIFICANT CHANGE UP (ref 1.7–9.3)
NEUTROPHILS # BLD AUTO: 6.06 K/UL — SIGNIFICANT CHANGE UP (ref 1.4–6.5)
NEUTROPHILS NFR BLD AUTO: 86.2 % — HIGH (ref 42.2–75.2)
NITRITE UR-MCNC: NEGATIVE — SIGNIFICANT CHANGE UP
NRBC BLD AUTO-RTO: 0 /100 WBCS — SIGNIFICANT CHANGE UP (ref 0–0)
OSMOLALITY UR: 140 MOS/KG — SIGNIFICANT CHANGE UP (ref 50–1200)
PH UR: 6 — SIGNIFICANT CHANGE UP (ref 5–8)
PLATELET # BLD AUTO: 215 K/UL — SIGNIFICANT CHANGE UP (ref 130–400)
PMV BLD: 9.4 FL — SIGNIFICANT CHANGE UP (ref 7.4–10.4)
POTASSIUM SERPL-MCNC: 4.2 MMOL/L — SIGNIFICANT CHANGE UP (ref 3.5–5)
POTASSIUM SERPL-MCNC: 5 MMOL/L — SIGNIFICANT CHANGE UP (ref 3.5–5)
POTASSIUM SERPL-SCNC: 4.2 MMOL/L — SIGNIFICANT CHANGE UP (ref 3.5–5)
POTASSIUM SERPL-SCNC: 5 MMOL/L — SIGNIFICANT CHANGE UP (ref 3.5–5)
POTASSIUM UR-SCNC: 9 MMOL/L — SIGNIFICANT CHANGE UP
PROT ?TM UR-MCNC: <5 MG/DL — SIGNIFICANT CHANGE UP
PROT SERPL-MCNC: 6.4 G/DL — SIGNIFICANT CHANGE UP (ref 6–8)
PROT UR-MCNC: NEGATIVE MG/DL — SIGNIFICANT CHANGE UP
PROT/CREAT UR-RTO: <0.4 RATIO — HIGH (ref 0–0.2)
PROTHROM AB SERPL-ACNC: 11.1 SEC — SIGNIFICANT CHANGE UP (ref 9.95–12.87)
RBC # BLD: 4.29 M/UL — SIGNIFICANT CHANGE UP (ref 4.2–5.4)
RBC # FLD: 13.7 % — SIGNIFICANT CHANGE UP (ref 11.5–14.5)
SODIUM SERPL-SCNC: 124 MMOL/L — LOW (ref 135–146)
SODIUM SERPL-SCNC: 139 MMOL/L — SIGNIFICANT CHANGE UP (ref 135–146)
SODIUM UR-SCNC: 28 MMOL/L — SIGNIFICANT CHANGE UP
SP GR SPEC: 1.01 — SIGNIFICANT CHANGE UP (ref 1–1.03)
TROPONIN T, HIGH SENSITIVITY RESULT: 8 NG/L — SIGNIFICANT CHANGE UP (ref 6–13)
UROBILINOGEN FLD QL: 0.2 MG/DL — SIGNIFICANT CHANGE UP (ref 0.2–1)
UUN UR-MCNC: 81 MG/DL — SIGNIFICANT CHANGE UP
WBC # BLD: 7.03 K/UL — SIGNIFICANT CHANGE UP (ref 4.8–10.8)
WBC # FLD AUTO: 7.03 K/UL — SIGNIFICANT CHANGE UP (ref 4.8–10.8)

## 2025-05-25 PROCEDURE — 93010 ELECTROCARDIOGRAM REPORT: CPT

## 2025-05-25 PROCEDURE — 85025 COMPLETE CBC W/AUTO DIFF WBC: CPT

## 2025-05-25 PROCEDURE — 82570 ASSAY OF URINE CREATININE: CPT

## 2025-05-25 PROCEDURE — 83036 HEMOGLOBIN GLYCOSYLATED A1C: CPT

## 2025-05-25 PROCEDURE — 99285 EMERGENCY DEPT VISIT HI MDM: CPT

## 2025-05-25 PROCEDURE — 70496 CT ANGIOGRAPHY HEAD: CPT | Mod: 26

## 2025-05-25 PROCEDURE — 70450 CT HEAD/BRAIN W/O DYE: CPT | Mod: 26,XU

## 2025-05-25 PROCEDURE — 36415 COLL VENOUS BLD VENIPUNCTURE: CPT

## 2025-05-25 PROCEDURE — 81003 URINALYSIS AUTO W/O SCOPE: CPT

## 2025-05-25 PROCEDURE — 99223 1ST HOSP IP/OBS HIGH 75: CPT

## 2025-05-25 PROCEDURE — 80061 LIPID PANEL: CPT

## 2025-05-25 PROCEDURE — 84133 ASSAY OF URINE POTASSIUM: CPT

## 2025-05-25 PROCEDURE — 83935 ASSAY OF URINE OSMOLALITY: CPT

## 2025-05-25 PROCEDURE — 80048 BASIC METABOLIC PNL TOTAL CA: CPT

## 2025-05-25 PROCEDURE — 84156 ASSAY OF PROTEIN URINE: CPT

## 2025-05-25 PROCEDURE — 70498 CT ANGIOGRAPHY NECK: CPT | Mod: 26

## 2025-05-25 PROCEDURE — 84300 ASSAY OF URINE SODIUM: CPT

## 2025-05-25 PROCEDURE — 84540 ASSAY OF URINE/UREA-N: CPT

## 2025-05-25 RX ORDER — ALPRAZOLAM 0.5 MG
0.5 TABLET, EXTENDED RELEASE 24 HR ORAL AT BEDTIME
Refills: 0 | Status: DISCONTINUED | OUTPATIENT
Start: 2025-05-25 | End: 2025-05-26

## 2025-05-25 RX ORDER — ESCITALOPRAM OXALATE 20 MG/1
20 TABLET ORAL DAILY
Refills: 0 | Status: DISCONTINUED | OUTPATIENT
Start: 2025-05-25 | End: 2025-05-26

## 2025-05-25 RX ORDER — ALPRAZOLAM 0.5 MG
0.5 TABLET, EXTENDED RELEASE 24 HR ORAL ONCE
Refills: 0 | Status: DISCONTINUED | OUTPATIENT
Start: 2025-05-25 | End: 2025-05-25

## 2025-05-25 RX ORDER — ONDANSETRON HCL/PF 4 MG/2 ML
4 VIAL (ML) INJECTION EVERY 8 HOURS
Refills: 0 | Status: DISCONTINUED | OUTPATIENT
Start: 2025-05-25 | End: 2025-05-26

## 2025-05-25 RX ORDER — LEVETIRACETAM 10 MG/ML
500 INJECTION, SOLUTION INTRAVENOUS
Refills: 0 | Status: DISCONTINUED | OUTPATIENT
Start: 2025-05-25 | End: 2025-05-26

## 2025-05-25 RX ORDER — ACETAMINOPHEN 500 MG/5ML
650 LIQUID (ML) ORAL EVERY 6 HOURS
Refills: 0 | Status: DISCONTINUED | OUTPATIENT
Start: 2025-05-25 | End: 2025-05-26

## 2025-05-25 RX ORDER — MELATONIN 5 MG
3 TABLET ORAL AT BEDTIME
Refills: 0 | Status: DISCONTINUED | OUTPATIENT
Start: 2025-05-25 | End: 2025-05-26

## 2025-05-25 RX ORDER — MAGNESIUM, ALUMINUM HYDROXIDE 200-200 MG
30 TABLET,CHEWABLE ORAL EVERY 4 HOURS
Refills: 0 | Status: DISCONTINUED | OUTPATIENT
Start: 2025-05-25 | End: 2025-05-26

## 2025-05-25 RX ADMIN — Medication 1 GRAM(S): at 21:16

## 2025-05-25 RX ADMIN — Medication 60 MILLILITER(S): at 21:11

## 2025-05-25 RX ADMIN — Medication 1 GRAM(S): at 18:23

## 2025-05-25 RX ADMIN — Medication 1000 MILLILITER(S): at 22:55

## 2025-05-25 RX ADMIN — ESCITALOPRAM OXALATE 20 MILLIGRAM(S): 20 TABLET ORAL at 21:12

## 2025-05-25 RX ADMIN — Medication 60 MILLILITER(S): at 18:09

## 2025-05-25 RX ADMIN — Medication 0.5 MILLIGRAM(S): at 16:04

## 2025-05-25 RX ADMIN — Medication 1000 MILLILITER(S): at 14:42

## 2025-05-25 RX ADMIN — LEVETIRACETAM 500 MILLIGRAM(S): 10 INJECTION, SOLUTION INTRAVENOUS at 18:23

## 2025-05-25 NOTE — ED ADULT NURSE NOTE - NSFALLRISKINTERV_ED_ALL_ED
Assistance OOB with selected safe patient handling equipment if applicable/Communicate fall risk and risk factors to all staff, patient, and family/Monitor for mental status changes and reorient to person, place, and time, as needed/Move patient closer to nursing station/within visual sight of ED staff/Provide visual cue: yellow wristband, yellow gown, etc/Reinforce activity limits and safety measures with patient and family/Toileting schedule using arm’s reach rule for commode and bathroom/Use of alarms - bed, stretcher, chair and/or video monitoring/Call bell, personal items and telephone in reach/Instruct patient to call for assistance before getting out of bed/chair/stretcher/Non-slip footwear applied when patient is off stretcher/[place] to call system/Physically safe environment - no spills, clutter or unnecessary equipment/Purposeful Proactive Rounding/Room/bathroom lighting operational, light cord in reach

## 2025-05-25 NOTE — H&P ADULT - ASSESSMENT
75-year-old female past medical history of anxiety, depression, 2 prior traumatic brain bleeds presenting to ED for nausea.  Sister at bedside.  States last traumatic brain was in March.  Patient was advised to get embolization w/ Dr. Parker and Dr. Metz but decided against it.    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  - CT brain neg for edema, neg from stroke or recurrent bleeding  - serum glucose 120s, BUN 7osm: 261 (LOW)  - holding sedatives for now (Xanax and mirtazapine)  - most probable etiology is recent SAH causing SIADH (56% of SAH pt develop SIADH, up to 75% of patents that develop hyponatriema post SAH is due to SIAD)  - non of her meds is associated with SIADH  - urine studies to confim diagnois  - bolus 100ml of 3% NACl of 2hrs  - water restriction    #SAH/SDH  - avoid anticoagulation  - CT is neg for recurrent bleed  - cw prophylactic Keppra    #Depression  - cw lexipro  - stopped Xanax for now 75-year-old female past medical history of anxiety, depression, 2 prior traumatic brain bleeds presenting to ED for nausea.  Sister at bedside.  States last traumatic brain was in March.  Patient was advised to get embolization w/ Dr. Parker and Dr. Metz but decided against it.    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  - CT brain neg for edema, neg from stroke or recurrent bleeding  - serum glucose 120s, BUN 7osm: 261 (LOW)  - holding sedatives for now (Xanax and mirtazapine)  - most probable etiology is recent SAH causing SIADH (56% of SAH pt develop SIADH, up to 75% of patents that develop hyponatriema post SAH is due to SIAD)  - non of her meds is associated with SIADH  - urine studies to confirm diagnosis  - 3% NaCl will be postponed as the patient symptoms completely resolved on normal saline infusion given by ED (1liter of NS), if declining Na can consider 50ml or symptomatic again the bolus 100ml of 3% NS  - water restriction  - salt tabs  - nephro consult    #SAH/SDH  - avoid anticoagulation  - CT is neg for recurrent bleed  - cw prophylactic Keppra    #DVT: SCD  #GI not needed    #Hand-off follow urine studies, follow Na level in BMP PT   #Depression  - cw lexipro  - stopped Xanax for now

## 2025-05-25 NOTE — ED PROVIDER NOTE - CLINICAL SUMMARY MEDICAL DECISION MAKING FREE TEXT BOX
75-year-old female history of prior SAH presents for evaluation of difficulty finding words.  Patient on Friday, May 24 following a May 25 had difficulty finding words which is most resolving today after feeling nauseous came to ED for evaluation.  Agree with above exam, in addition pt is neuro back to baseline  impression  Patient here with difficulty word finding, AMS which has resolved.  CT imaging negative.  Patient found to be hyponatremic to 124 will admit for further evaluation.

## 2025-05-25 NOTE — ED PROVIDER NOTE - EKG/XRAY ADDITIONAL INFORMATION
EKG Interpretation by Dr. Samson Delgadillo: normal  EKG: NSR, nml axis, normal intervals, no ST Elevations

## 2025-05-25 NOTE — ED PROVIDER NOTE - PHYSICAL EXAMINATION
CONSTITUTIONAL: well-appearing, in NAD  SKIN: Warm dry, normal skin turgor  HEAD: NCAT  EYES: EOMI, PERRLA, no scleral icterus, conjunctiva pink  ENT: normal pharynx with no erythema or exudates  NECK: Supple; non tender. Full ROM.  CARD: RRR  RESP: clear to ausculation b/l. No crackles or wheezing.  ABD: soft, non-tender, non-distended, no rebound or guarding.  EXT: Full ROM, no bony tenderness, no pedal edema, no calf tenderness  NEURO: L sided facial droop. normal sensory. CN II-XII intact. Cerebellar testing normal. Normal gait. [place] -,   PSYCH: Cooperative, appropriate.

## 2025-05-25 NOTE — ED ADULT NURSE NOTE - CHIEF COMPLAINT QUOTE
Pt unable to make out her words since Friday. pt had brain bleed on Friday. L sided droop noted but states this is from previous admission. pt also having nausea since this morning. . cleared to go to [facility] by crit.

## 2025-05-25 NOTE — H&P ADULT - NSHPLABSRESULTS_GEN_ALL_CORE
CBC Full  -  ( 25 May 2025 12:41 )  WBC Count : 7.03 K/uL  RBC Count : 4.29 M/uL  Hemoglobin : 12.6 g/dL  Hematocrit : 36.6 %  Platelet Count - Automated : 215 K/uL  Mean Cell Volume : 85.3 fL  Mean Cell Hemoglobin : 29.4 pg  Mean Cell Hemoglobin Concentration : 34.4 g/dL  Auto Neutrophil # : x  Auto Lymphocyte # : x  Auto Monocyte # : x  Auto Eosinophil # : x  Auto Basophil # : x  Auto Neutrophil % : x  Auto Lymphocyte % : x  Auto Monocyte % : x  Auto Eosinophil % : x  Auto Basophil % : x    05-25    124[L]  |  89[L]  |  7[L]  ----------------------------<  107[H]  5.0   |  22  |  0.6[L]    Ca    9.3      25 May 2025 12:41    TPro  6.4  /  Alb  4.5  /  TBili  1.2  /  DBili  x   /  AST  23  /  ALT  10  /  AlkPhos  97  05-25

## 2025-05-25 NOTE — ED ADULT NURSE NOTE - BEFAST ARM NUMBNESS
"Chief Complaint   Patient presents with     Ear Problem     The outer ear hurts on her left ear       Initial /64 (BP Location: Right arm, Patient Position: Sitting, Cuff Size: Adult Regular)  Pulse 92  Temp 98.9  F (37.2  C) (Tympanic)  Resp 16  Wt 141 lb 3.2 oz (64 kg)  BMI 21.47 kg/m2 Estimated body mass index is 21.47 kg/(m^2) as calculated from the following:    Height as of 8/20/18: 5' 8\" (1.727 m).    Weight as of this encounter: 141 lb 3.2 oz (64 kg).    Patient presents to the clinic using No DME    Health Maintenance that is potentially due pending provider review:  NONE    n/a    Is there anyone who you would like to be able to receive your results? No  If yes have patient fill out THONG    Bibiana Deutsch CMA    "
No

## 2025-05-25 NOTE — ED ADULT TRIAGE NOTE - CHIEF COMPLAINT QUOTE
Pt unable to make out her words since Friday. pt had brain bleed on Friday. L sided droop noted but states this is from previous admission. pt also having nausea since this morning. . cleared to go to [facility] by crit.

## 2025-05-25 NOTE — ED PROVIDER NOTE - OBJECTIVE STATEMENT
80-year-old male with past medical 75-year-old female past medical history of anxiety, depression, 2 prior traumatic brain bleeds presenting to ED for nausea.  Sister at bedside.  States last traumatic brain was in March.  Patient was with skin embolization w/ Dr. Parker and Dr. Metz but decided against it.  Patient started to have word finding difficulty on Friday.  Patient started to experience nausea yesterday.  Patient has had multiple episodes of nonbilious nonbloody emesis today.  Patient is only currently complaining of nausea.  No previous abdominal surgeries, recent illnesses, fevers, chills, diarrhea, weakness, numbness. 75-year-old female past medical history of anxiety, depression, 2 prior traumatic brain bleeds presenting to ED for nausea.  Sister at bedside.  States last traumatic brain was in March.  Patient was advised to get embolization w/ Dr. Parker and Dr. Metz but decided against it.  Patient started to have word finding difficulty on Friday.  Patient started to experience nausea yesterday.  Patient has had multiple episodes of nonbilious nonbloody emesis today.  Patient is only currently complaining of nausea.  No previous abdominal surgeries, recent illnesses, fevers, chills, diarrhea, weakness, numbness.

## 2025-05-25 NOTE — H&P ADULT - ATTENDING COMMENTS
Assessment    Symptomatic hyponatremia (symptoms now resolved), likely secondary to SIADH doub CSWS (denies polyuria)  Recent SAH and known SDH (CT showing improvement)  On keppra for ppx  Depression    Plan    - patient's symptoms improved after NS bolus in the ED, c/w NS maintenance for now, trend BMP, avoid overcorrection now that patient is asymptomatic, for now can continue NaCl PO, f/u nephrology  - takes xanax at home, will now make PRN as patient's symptoms resolved    Pending: trend bmps, nephrology    # DVT PPX: SCDs    GENERAL: NAD, lying in bed comfortably  HEAD:  Atraumatic, normocephalic  NERVOUS SYSTEM:  A&Ox3, moving all extremities, no focal deficits   EYES: EOMI, PERRL  NECK: Supple, trachea midline, no JVD  HEART: Regular rate and rhythm  LUNGS: Clear to auscultation bilaterally, no crackles, wheezing, or rhonchi  ABDOMEN: Soft, nontender, nondistended, +BS  EXTREMITIES: 2+ peripheral pulses bilaterally. No clubbing, cyanosis, or edema    75 minutes spent on review of labs, imaging studies, old records, obtaining history, personally examining patient, counselling and communicating with patient/ family, entering orders for medications/tests/etc, discussions with other health care providers, documentation in electronic health records, independent interpretation of labs, imaging/procedure results and care coordination.

## 2025-05-25 NOTE — PATIENT PROFILE ADULT - FALL HARM RISK - HARM RISK INTERVENTIONS
Communicate Risk of Fall with Harm to all staff/Monitor gait and stability/Tailored Fall Risk Interventions/Use of alarms - bed, chair and/or voice tab/Visual Cue: Yellow wristband and red socks/Bed in lowest position, wheels locked, appropriate side rails in place/Call bell, personal items and telephone in reach/Instruct patient to call for assistance before getting out of bed or chair/Non-slip footwear when patient is out of bed/[place] to call system/Physically safe environment - no spills, clutter or unnecessary equipment/Purposeful Proactive Rounding/Room/bathroom lighting operational, light cord in reach

## 2025-05-25 NOTE — H&P ADULT - HISTORY OF PRESENT ILLNESS
75-year-old female past medical history of anxiety, depression, 2 prior traumatic brain bleeds presenting to ED for nausea.  Sister at bedside.  States last traumatic brain was in March.  Patient was advised to get embolization w/ Dr. Parker and Dr. Metz but decided against it.  Patient started to have word finding difficulty on Friday.  Patient started to experience nausea yesterday.  Patient has had multiple episodes of nonbilious nonbloody emesis today.  Patient is only currently complaining of nausea.  No previous abdominal surgeries, recent illnesses, fevers, chills, diarrhea, weakness, numbness.    patient was recently discharged in feb 2025 with chornic left SDH, acute right SAH/ Intraparenchymal hematoma, and an acute right greater trochanteric fracture refused embolization treated conservatively     CT head and CT angio were negative for bleeding/hematoma/stroke    LAb work positive for severe hyponatremia 124     Vital Signs Last 24 Hrs  T(C): 36.5 (25 May 2025 16:05), Max: 36.5 (25 May 2025 16:05)  T(F): 97.7 (25 May 2025 16:05), Max: 97.7 (25 May 2025 16:05)  HR: 79 (25 May 2025 16:05) (79 - 84)  BP: 148/83 (25 May 2025 16:05) (148/83 - 159/84)  BP(mean): --  RR: 18 (25 May 2025 16:05) (18 - 18)  SpO2: 98% (25 May 2025 16:05) (98% - 98%)    Parameters below as of 25 May 2025 16:05  Patient On (Oxygen Delivery Method): room air

## 2025-05-25 NOTE — PATIENT PROFILE ADULT - NSPROGENPREVTRANSF_GEN_A_NUR
"Reports in Nov and Dec had throat pain and \"upper bronchial\" cough and now mild sore throat.  In past on Zpak and helped    Reports slight tight cough,   Recommend Albuterol inhaler, cough drops,  Contingent antibiotic.  "
Reports here for refills of Testosterone gel spray  Discussed doing 2 months worth this time and make f/u appt for transfer of care to another provider in 2 months, as I am leaving the practice.  
no

## 2025-05-25 NOTE — H&P ADULT - NSHPPHYSICALEXAM_GEN_ALL_CORE
T(C): 36.5 (05-25-25 @ 16:05), Max: 36.5 (05-25-25 @ 16:05)  HR: 79 (05-25-25 @ 16:05) (79 - 84)  BP: 148/83 (05-25-25 @ 16:05) (148/83 - 159/84)  RR: 18 (05-25-25 @ 16:05) (18 - 18)  SpO2: 98% (05-25-25 @ 16:05) (98% - 98%)    CONSTITUTIONAL: weak frail  EYES: PERRLA and symmetric, EOMI, No conjunctival or scleral injection, non-icteric  ENMT: Oral mucosa with moist membranes. Normal dentition; no pharyngeal injection or exudates             NECK: Supple, symmetric and without tracheal deviation   RESP: No respiratory distress, no use of accessory muscles; CTA b/l, no WRR  CV: RRR, +S1S2, no MRG; no JVD; no peripheral edema  GI: Soft, NT, ND, no rebound, no guarding; no palpable masses; no hepatosplenomegaly; no hernia palpated  LYMPH: No cervical LAD or tenderness; no axillary LAD or tenderness; no inguinal LAD or tenderness  MSK: Normal gait; No digital clubbing or cyanosis; examination of the (head/neck/spine/ribs/pelvis, RUE, LUE, RLE, LLE) without misalignment,            Normal ROM without pain, no spinal tenderness, normal muscle strength/tone  SKIN: No rashes or ulcers noted; no subcutaneous nodules or induration palpable  NEURO: CN II-XII intact; normal reflexes in upper and lower extremities, sensation intact in upper and lower extremities b/l to light touch   PSYCH: Appropriate insight/judgment; A+O x 3, mood and affect appropriate, recent/remote memory intact

## 2025-05-25 NOTE — ED ADULT NURSE NOTE - OBJECTIVE STATEMENT
pt from home with stroke like symptoms since Friday, unable to speak when asked certain questions like Birthday. unable to state verbally

## 2025-05-26 ENCOUNTER — TRANSCRIPTION ENCOUNTER (OUTPATIENT)
Age: 75
End: 2025-05-26

## 2025-05-26 VITALS
RESPIRATION RATE: 18 BRPM | DIASTOLIC BLOOD PRESSURE: 68 MMHG | OXYGEN SATURATION: 97 % | SYSTOLIC BLOOD PRESSURE: 111 MMHG | HEART RATE: 67 BPM | TEMPERATURE: 97 F

## 2025-05-26 LAB
A1C WITH ESTIMATED AVERAGE GLUCOSE RESULT: 5.3 % — SIGNIFICANT CHANGE UP (ref 4–5.6)
ANION GAP SERPL CALC-SCNC: 11 MMOL/L — SIGNIFICANT CHANGE UP (ref 7–14)
ANION GAP SERPL CALC-SCNC: 11 MMOL/L — SIGNIFICANT CHANGE UP (ref 7–14)
ANION GAP SERPL CALC-SCNC: 12 MMOL/L — SIGNIFICANT CHANGE UP (ref 7–14)
BASOPHILS # BLD AUTO: 0.04 K/UL — SIGNIFICANT CHANGE UP (ref 0–0.2)
BASOPHILS NFR BLD AUTO: 0.9 % — SIGNIFICANT CHANGE UP (ref 0–1)
BUN SERPL-MCNC: 11 MG/DL — SIGNIFICANT CHANGE UP (ref 10–20)
BUN SERPL-MCNC: 11 MG/DL — SIGNIFICANT CHANGE UP (ref 10–20)
BUN SERPL-MCNC: 12 MG/DL — SIGNIFICANT CHANGE UP (ref 10–20)
CALCIUM SERPL-MCNC: 8.8 MG/DL — SIGNIFICANT CHANGE UP (ref 8.4–10.5)
CALCIUM SERPL-MCNC: 8.8 MG/DL — SIGNIFICANT CHANGE UP (ref 8.4–10.5)
CALCIUM SERPL-MCNC: 8.9 MG/DL — SIGNIFICANT CHANGE UP (ref 8.4–10.5)
CHLORIDE SERPL-SCNC: 100 MMOL/L — SIGNIFICANT CHANGE UP (ref 98–110)
CHLORIDE SERPL-SCNC: 105 MMOL/L — SIGNIFICANT CHANGE UP (ref 98–110)
CHLORIDE SERPL-SCNC: 106 MMOL/L — SIGNIFICANT CHANGE UP (ref 98–110)
CHOLEST SERPL-MCNC: 177 MG/DL — SIGNIFICANT CHANGE UP
CO2 SERPL-SCNC: 22 MMOL/L — SIGNIFICANT CHANGE UP (ref 17–32)
CO2 SERPL-SCNC: 23 MMOL/L — SIGNIFICANT CHANGE UP (ref 17–32)
CO2 SERPL-SCNC: 23 MMOL/L — SIGNIFICANT CHANGE UP (ref 17–32)
CREAT SERPL-MCNC: 0.6 MG/DL — LOW (ref 0.7–1.5)
EGFR: 94 ML/MIN/1.73M2 — SIGNIFICANT CHANGE UP
EOSINOPHIL # BLD AUTO: 0.01 K/UL — SIGNIFICANT CHANGE UP (ref 0–0.7)
EOSINOPHIL NFR BLD AUTO: 0.2 % — SIGNIFICANT CHANGE UP (ref 0–8)
ESTIMATED AVERAGE GLUCOSE: 105 MG/DL — SIGNIFICANT CHANGE UP (ref 68–114)
GLUCOSE SERPL-MCNC: 89 MG/DL — SIGNIFICANT CHANGE UP (ref 70–99)
GLUCOSE SERPL-MCNC: 90 MG/DL — SIGNIFICANT CHANGE UP (ref 70–99)
GLUCOSE SERPL-MCNC: 94 MG/DL — SIGNIFICANT CHANGE UP (ref 70–99)
HCT VFR BLD CALC: 36.4 % — LOW (ref 37–47)
HDLC SERPL-MCNC: 85 MG/DL — SIGNIFICANT CHANGE UP
HGB BLD-MCNC: 12.3 G/DL — SIGNIFICANT CHANGE UP (ref 12–16)
IMM GRANULOCYTES NFR BLD AUTO: 0.2 % — SIGNIFICANT CHANGE UP (ref 0.1–0.3)
LDLC SERPL-MCNC: 83 MG/DL — SIGNIFICANT CHANGE UP
LIPID PNL WITH DIRECT LDL SERPL: 83 MG/DL — SIGNIFICANT CHANGE UP
LYMPHOCYTES # BLD AUTO: 1.26 K/UL — SIGNIFICANT CHANGE UP (ref 1.2–3.4)
LYMPHOCYTES # BLD AUTO: 29.2 % — SIGNIFICANT CHANGE UP (ref 20.5–51.1)
MCHC RBC-ENTMCNC: 29.1 PG — SIGNIFICANT CHANGE UP (ref 27–31)
MCHC RBC-ENTMCNC: 33.8 G/DL — SIGNIFICANT CHANGE UP (ref 32–37)
MCV RBC AUTO: 86.1 FL — SIGNIFICANT CHANGE UP (ref 81–99)
MONOCYTES # BLD AUTO: 0.41 K/UL — SIGNIFICANT CHANGE UP (ref 0.1–0.6)
MONOCYTES NFR BLD AUTO: 9.5 % — HIGH (ref 1.7–9.3)
NEUTROPHILS # BLD AUTO: 2.59 K/UL — SIGNIFICANT CHANGE UP (ref 1.4–6.5)
NEUTROPHILS NFR BLD AUTO: 60 % — SIGNIFICANT CHANGE UP (ref 42.2–75.2)
NONHDLC SERPL-MCNC: 92 MG/DL — SIGNIFICANT CHANGE UP
NRBC BLD AUTO-RTO: 0 /100 WBCS — SIGNIFICANT CHANGE UP (ref 0–0)
PLATELET # BLD AUTO: 190 K/UL — SIGNIFICANT CHANGE UP (ref 130–400)
PMV BLD: 9.9 FL — SIGNIFICANT CHANGE UP (ref 7.4–10.4)
POTASSIUM SERPL-MCNC: 4 MMOL/L — SIGNIFICANT CHANGE UP (ref 3.5–5)
POTASSIUM SERPL-MCNC: 4.3 MMOL/L — SIGNIFICANT CHANGE UP (ref 3.5–5)
POTASSIUM SERPL-MCNC: 4.5 MMOL/L — SIGNIFICANT CHANGE UP (ref 3.5–5)
POTASSIUM SERPL-SCNC: 4 MMOL/L — SIGNIFICANT CHANGE UP (ref 3.5–5)
POTASSIUM SERPL-SCNC: 4.3 MMOL/L — SIGNIFICANT CHANGE UP (ref 3.5–5)
POTASSIUM SERPL-SCNC: 4.5 MMOL/L — SIGNIFICANT CHANGE UP (ref 3.5–5)
RBC # BLD: 4.23 M/UL — SIGNIFICANT CHANGE UP (ref 4.2–5.4)
RBC # FLD: 13.9 % — SIGNIFICANT CHANGE UP (ref 11.5–14.5)
SODIUM SERPL-SCNC: 135 MMOL/L — SIGNIFICANT CHANGE UP (ref 135–146)
SODIUM SERPL-SCNC: 138 MMOL/L — SIGNIFICANT CHANGE UP (ref 135–146)
SODIUM SERPL-SCNC: 140 MMOL/L — SIGNIFICANT CHANGE UP (ref 135–146)
TRIGL SERPL-MCNC: 42 MG/DL — SIGNIFICANT CHANGE UP
WBC # BLD: 4.32 K/UL — LOW (ref 4.8–10.8)
WBC # FLD AUTO: 4.32 K/UL — LOW (ref 4.8–10.8)

## 2025-05-26 PROCEDURE — 99239 HOSP IP/OBS DSCHRG MGMT >30: CPT

## 2025-05-26 RX ORDER — SODIUM CHLORIDE 9 G/1000ML
1000 INJECTION, SOLUTION INTRAVENOUS
Refills: 0 | Status: DISCONTINUED | OUTPATIENT
Start: 2025-05-26 | End: 2025-05-26

## 2025-05-26 RX ORDER — DESMOPRESSIN ACETATE 4 UG/ML
2 INJECTION INTRAVENOUS ONCE
Refills: 0 | Status: COMPLETED | OUTPATIENT
Start: 2025-05-26 | End: 2025-05-26

## 2025-05-26 RX ADMIN — LEVETIRACETAM 500 MILLIGRAM(S): 10 INJECTION, SOLUTION INTRAVENOUS at 05:12

## 2025-05-26 RX ADMIN — SODIUM CHLORIDE 70 MILLILITER(S): 9 INJECTION, SOLUTION INTRAVENOUS at 05:12

## 2025-05-26 RX ADMIN — DESMOPRESSIN ACETATE 2 MICROGRAM(S): 4 INJECTION INTRAVENOUS at 01:21

## 2025-05-26 RX ADMIN — SODIUM CHLORIDE 70 MILLILITER(S): 9 INJECTION, SOLUTION INTRAVENOUS at 01:14

## 2025-05-26 RX ADMIN — ESCITALOPRAM OXALATE 20 MILLIGRAM(S): 20 TABLET ORAL at 11:00

## 2025-05-26 NOTE — DISCHARGE NOTE NURSING/CASE MANAGEMENT/SOCIAL WORK - FINANCIAL ASSISTANCE
[facility] provides services at a reduced cost to those who are determined to be eligible through [facility]’s financial assistance program. Information regarding [facility]’s financial assistance program can be found by going to [email or web address] or by calling [phone].

## 2025-05-26 NOTE — CONSULT NOTE ADULT - SUBJECTIVE AND OBJECTIVE BOX
NEPHROLOGY CONSULTATION NOTE    NELSON EUBANKS  75y  Female  MRN-[id number]    CC:   Patient is a 75y old  Female who presents with a chief complaint of nausea, vomiting (26 May 2025 09:51)      HPI:  75-year-old female past medical history of anxiety, depression, 2 prior traumatic brain bleeds presenting to ED for nausea.  Sister at bedside.  States last traumatic brain was in March.  Patient was advised to get embolization w/ Dr. Parker and Dr. Metz but decided against it.  Patient started to have word finding difficulty on Friday.  Patient started to experience nausea yesterday.  Patient has had multiple episodes of nonbilious nonbloody emesis today.  Patient is only currently complaining of nausea.  No previous abdominal surgeries, recent illnesses, fevers, chills, diarrhea, weakness, numbness.    patient was recently discharged in feb 2025 with chornic left SDH, acute right SAH/ Intraparenchymal hematoma, and an acute right greater trochanteric fracture refused embolization treated conservatively     CT head and CT angio were negative for bleeding/hematoma/stroke    LAb work positive for severe hyponatremia 124     Vital Signs Last 24 Hrs  T(C): 36.5 (25 May 2025 16:05), Max: 36.5 (25 May 2025 16:05)  T(F): 97.7 (25 May 2025 16:05), Max: 97.7 (25 May 2025 16:05)  HR: 79 (25 May 2025 16:05) (79 - 84)  BP: 148/83 (25 May 2025 16:05) (148/83 - 159/84)  BP(mean): --  RR: 18 (25 May 2025 16:05) (18 - 18)  SpO2: 98% (25 May 2025 16:05) (98% - 98%)    Parameters below as of 25 May 2025 16:05  Patient On (Oxygen Delivery Method): room air     (25 May 2025 16:30)      PAST MEDICAL & SURGICAL HISTORY:  Anxiety      Depression, major      Brain bleed        Allergies:  No Known Allergies    Home Medications Reviewed  Hospital Medications:   MEDICATIONS  (STANDING):  escitalopram 20 milliGRAM(s) Oral daily  levETIRAcetam 500 milliGRAM(s) Oral two times a day    MEDICATIONS  (PRN):  acetaminophen     Tablet .. 650 milliGRAM(s) Oral every 6 hours PRN Temp greater or equal to 38C (100.4F), Mild Pain (1 - 3)  ALPRAZolam 0.5 milliGRAM(s) Oral at bedtime PRN anxiety  aluminum hydroxide/magnesium hydroxide/simethicone Suspension 30 milliLiter(s) Oral every 4 hours PRN Dyspepsia  melatonin 3 milliGRAM(s) Oral at bedtime PRN Insomnia  ondansetron Injectable 4 milliGRAM(s) IV Push every 8 hours PRN Nausea and/or Vomiting    Home Medications:  acetaminophen 325 mg oral tablet: 2 tab(s) orally every 6 hours As needed Mild Pain (1 - 3) (11 Mar 2025 07:50)  Lexapro 20 mg oral tablet: 1 tab(s) orally once a day (08 Mar 2025 19:32)  lidocaine 4% topical film: Apply topically to affected area once a day as needed for  moderate pain (11 Mar 2025 07:50)  mirtazapine 7.5 mg oral tablet: 2 tab(s) orally once a day (08 Mar 2025 19:32)  Seroquel 100 mg oral tablet: 1 tab(s) orally once a day (at bedtime) (08 Mar 2025 19:32)  Xanax 0.5 mg oral tablet: 1 tab(s) orally once a day (at bedtime) (08 Mar 2025 19:32)      SOCIAL HISTORY:  Social History:      FAMILY HISTORY:      REVIEW OF SYSTEMS:  All other review of systems is negative unless indicated above.    VITALS:  T(F): 97.4 (05-26-25 @ 04:55), Max: 97.8 (05-25-25 @ 19:30)  HR: 67 (05-26-25 @ 04:55)  BP: 111/68 (05-26-25 @ 04:55)  RR: 18 (05-26-25 @ 04:55)  SpO2: 97% (05-26-25 @ 04:55)      I&O's Detail        I&O's Summary      PHYSICAL EXAM:  Gen: NAD  resp: b/l breath sounds  card: S1/S2  abd: soft  ext: no edema    LABS:      05-26    138  |  105  |  11  ----------------------------<  89  4.0   |  22  |  0.6[L]    Ca    8.8      26 May 2025 05:15    TPro  6.4  /  Alb  4.5  /  TBili  1.2  /  DBili      /  AST  23  /  ALT  10  /  AlkPhos  97  05-25    Sodium: 138 mmol/L (05-26-25 @ 05:15)  Sodium: 140 mmol/L (05-26-25 @ 00:17)  Sodium: 139 mmol/L (05-25-25 @ 22:37)  Sodium: 124 mmol/L (05-25-25 @ 12:41)  Sodium: 136 mmol/L (03-10-25 @ 20:11)    Osmolality, Random Urine: 140 mos/kg [50 - 1200] (05-25-25 @ 17:45)    Sodium, Random Urine: 28.0 mmoL/L (05-25-25 @ 17:45)                                  12.3   4.32  )-----------( 190      ( 26 May 2025 05:15 )             36.4     Mean Cell Volume: 86.1 fL (05-26-25 @ 05:15)    Urine Studies:  Protein, Urine: Negative mg/dL (05-25-25 @ 17:45)  Protein, Urine: Negative mg/dL (03-09-25 @ 04:20)    Sodium, Random Urine: 28.0 mmoL/L (05-25 @ 17:45)  Creatinine, Random Urine: 12 mg/dL (05-25 @ 17:45)            RADIOLOGY & ADDITIONAL STUDIES:        CT Abdomen and Pelvis w/ IV Cont:   ACC: [id number] EXAM:  CT ABDOMEN AND PELVIS IC   ORDERED BY: MICHAEL ALBERT     ACC: [id number] EXAM:  CT CHEST IC   ORDERED BY: MICHAEL ALBERT     PROCEDURE DATE:  03/08/2025          INTERPRETATION:  CLINICAL HISTORY/REASON FOR EXAM: Trauma to chest,   abdomen and pelvis. Unwitnessed fall.    TECHNIQUE: Contiguous axial CT images were obtained from the thoracic   inlet to the pubic symphysis following administration of 100 cc Omnipaque   350 intravenous contrast.  Oral contrast was not administered.   Reformatted images in the coronal and sagittal planes were acquired. 3D   (MIP) images obtained.    COMPARISON: None.      FINDINGS:    CHEST:    LUNGS, PLEURA, AIRWAYS: No lobar consolidation, mass, effusion, or   pneumothorax. No evidence of central endobronchial obstruction. No   bronchiectasis or honeycombing. No suspicious pulmonary nodule. Biapical   scarring. Bilateral subsegmental atelectasis.    THORACIC NODES: No mediastinal, hilar, supraclavicular, or axillary   lymphadenopathy.    MEDIASTINUM/GREAT VESSELS: No pericardial effusion. Heart size is within   normal limits. The aorta and main pulmonary artery are of normal caliber.    ABDOMEN/PELVIS:    HEPATOBILIARY: Unremarkable.    SPLEEN: Unremarkable.    PANCREAS: Unremarkable.    ADRENAL GLANDS: Unremarkable.    KIDNEYS: Symmetric pattern of renal enhancement. No hydronephrosis   bilaterally.    ABDOMINOPELVIC NODES: No lymphadenopathy.    PELVIC ORGANS: Unremarkable.    PERITONEUM/MESENTERY/BOWEL: No bowel obstruction. No ascites or   pneumoperitoneum. Small hiatal hernia.    BONES/SOFT TISSUES: Acute, comminuted right femoral greater trochanteric   fractures. Chronic bilateral rib fracture. Osteopenia. Vertebral body   hemangiomas.    VASCULAR : Scattered vascular calcifications.        IMPRESSION:  Acute, comminuted right femoral GREATER trochanteric fracture.    --- End of Report ---            FLORENTINO MEDINA MD; Attending Radiologist  This document has been electronically signed. Mar  8 2025  4:57PM (03-08-25 @ 16:37)

## 2025-05-26 NOTE — DISCHARGE NOTE PROVIDER - NSDCCPCAREPLAN_GEN_ALL_CORE_FT
PRINCIPAL DISCHARGE DIAGNOSIS  Diagnosis: Hyponatremia  Assessment and Plan of Treatment: You came in with nausea and vomiting. On routine blood work you were found to have very low sodium levels. This improved with fluids. It is likely that the low sodium levels were caused by dehydration from vomiting.   We monitored your sodium levels and they are now within range. Please continue taking all your medications as prescribed and follow up with your doctors outpatient.  Please return to the ER immediately if you experience severe nausea vomiting, dizziness, any changes in mental status or seizures.      SECONDARY DISCHARGE DIAGNOSES  Diagnosis: AMS (altered mental status)  Assessment and Plan of Treatment:      PRINCIPAL DISCHARGE DIAGNOSIS  Diagnosis: Hyponatremia  Assessment and Plan of Treatment: You came in with nausea and vomiting. On routine blood work you were found to have very low sodium levels. This improved with fluids. It is likely that the low sodium levels were caused by dehydration from vomiting. Please increase your salt intake.   We monitored your sodium levels and they are now within range. Please continue taking all your medications as prescribed and follow up with your doctors outpatient.  Please return to the ER immediately if you experience severe nausea vomiting, dizziness, any changes in mental status or seizures.      SECONDARY DISCHARGE DIAGNOSES  Diagnosis: AMS (altered mental status)  Assessment and Plan of Treatment:

## 2025-05-26 NOTE — CONSULT NOTE ADULT - ASSESSMENT
Hyponatremia / polydipsia and on SSRI (urine osm low in favor of the former)  - resolved  - Na was 124-- no significant risk for ODS / got DDAVP and d5w   - limit excessive fluid intake

## 2025-05-26 NOTE — CHART NOTE - NSCHARTNOTEFT_GEN_A_CORE
Sodium increased to 139, NS stopped.  Patient is asymptomatic, desmopressin ordered and starting D5. F/u BMP

## 2025-05-26 NOTE — DISCHARGE NOTE PROVIDER - HOSPITAL COURSE
75-year-old female past medical history of anxiety, depression, 2 prior traumatic brain bleeds presenting to ED for nausea and has had multiple episodes of nonbilious nonbloody emesis on day of presentation. No previous abdominal surgeries, recent illnesses, fevers, chills, diarrhea, weakness, numbness. She was recently discharged in feb 2025 with chornic left SDH, acute right SAH/ Intraparenchymal hematoma, and an acute right greater trochanteric fracture refused embolization treated conservatively.     CT head and CT angio were negative for bleeding/hematoma/stroke. Initial bloodwork showed hyponatremia 124, which improved with NS to 139. She also received desmopressin x 1. The acute hyponatremia was likely in the setting of hypovolemia 2/2 vomiting.     She was monitored in the hospital and sx resolved. Patient is medically stable and ready for discharge.   75-year-old female past medical history of anxiety, depression, 2 prior traumatic brain bleeds presenting to ED for nausea and has had multiple episodes of nonbilious nonbloody emesis on day of presentation. No previous abdominal surgeries, recent illnesses, fevers, chills, diarrhea, weakness, numbness. She was recently discharged in feb 2025 with chornic left SDH, acute right SAH/ Intraparenchymal hematoma, and an acute right greater trochanteric fracture refused embolization treated conservatively.     CT head and CT angio were negative for bleeding/hematoma/stroke. Initial blood work showed hyponatremia 124, which improved with NS to 139. She also received desmopressin x 1. The acute hyponatremia was likely in the setting of hypovolemia 2/2 vomiting as well as potentially low solute intake.     She was monitored in the hospital and sx resolved. Patient is medically stable and ready for discharge.

## 2025-05-26 NOTE — DISCHARGE NOTE NURSING/CASE MANAGEMENT/SOCIAL WORK - NSDCPEFALRISK_GEN_ALL_CORE
For information on Fall & Injury Prevention, visit: [email or web address] OR  [email or web address] OR  https://www.cdc.gov/steadi/patient.html

## 2025-05-26 NOTE — DISCHARGE NOTE NURSING/CASE MANAGEMENT/SOCIAL WORK - PATIENT PORTAL LINK FT
You can access the FollowMyHealth Patient Portal offered by [facility] by registering at the following website: [email or web address]. By joining [employer]’s FollowMyHealth portal, you will also be able to view your health information using other applications (apps) compatible with our system.

## 2025-05-26 NOTE — DISCHARGE NOTE PROVIDER - ATTENDING DISCHARGE PHYSICAL EXAMINATION:
PHYSICAL EXAM:  GENERAL: NAD, well-developed  HEAD:  Atraumatic, Normocephalic  EYES: EOMI, PERRLA, conjunctiva and sclera clear  NECK: Supple, No JVD  CHEST/LUNG: Clear to auscultation bilaterally; No wheeze  HEART: Regular rate and rhythm; No murmurs, rubs, or gallops  ABDOMEN: Soft, Nontender, Nondistended; Bowel sounds present  EXTREMITIES:  2+ Peripheral Pulses, No clubbing, cyanosis, or edema  SKIN: No rashes or lesions

## 2025-05-26 NOTE — DISCHARGE NOTE PROVIDER - NSDCFUSCHEDAPPT_GEN_ALL_CORE_FT
Mio Sexton Physician [facility]  [facility] [address]  Scheduled Appointment: 07/08/2025

## 2025-06-02 DIAGNOSIS — R41.82 ALTERED MENTAL STATUS, UNSPECIFIED: ICD-10-CM

## 2025-06-02 DIAGNOSIS — Z79.899 OTHER LONG TERM (CURRENT) DRUG THERAPY: ICD-10-CM

## 2025-06-02 DIAGNOSIS — E86.1 HYPOVOLEMIA: ICD-10-CM

## 2025-06-02 DIAGNOSIS — E86.0 DEHYDRATION: ICD-10-CM

## 2025-06-02 DIAGNOSIS — F41.9 ANXIETY DISORDER, UNSPECIFIED: ICD-10-CM

## 2025-06-02 DIAGNOSIS — R11.2 NAUSEA WITH VOMITING, UNSPECIFIED: ICD-10-CM

## 2025-06-02 DIAGNOSIS — Z87.820 PERSONAL HISTORY OF TRAUMATIC BRAIN INJURY: ICD-10-CM

## 2025-06-02 DIAGNOSIS — E87.1 HYPO-OSMOLALITY AND HYPONATREMIA: ICD-10-CM

## 2025-07-01 ENCOUNTER — EMERGENCY (EMERGENCY)
Facility: HOSPITAL | Age: 75
LOS: 0 days | Discharge: HOME | End: 2025-07-02
Attending: EMERGENCY MEDICINE
Payer: MEDICARE

## 2025-07-01 VITALS
TEMPERATURE: 98 F | HEIGHT: 65 IN | HEART RATE: 79 BPM | RESPIRATION RATE: 20 BRPM | SYSTOLIC BLOOD PRESSURE: 139 MMHG | DIASTOLIC BLOOD PRESSURE: 82 MMHG | WEIGHT: 139.99 LBS | OXYGEN SATURATION: 96 %

## 2025-07-01 DIAGNOSIS — F32.A DEPRESSION, UNSPECIFIED: ICD-10-CM

## 2025-07-01 DIAGNOSIS — Z87.81 PERSONAL HISTORY OF (HEALED) TRAUMATIC FRACTURE: ICD-10-CM

## 2025-07-01 DIAGNOSIS — F41.9 ANXIETY DISORDER, UNSPECIFIED: ICD-10-CM

## 2025-07-01 DIAGNOSIS — E87.1 HYPO-OSMOLALITY AND HYPONATREMIA: ICD-10-CM

## 2025-07-01 DIAGNOSIS — Z56.0 UNEMPLOYMENT, UNSPECIFIED: ICD-10-CM

## 2025-07-01 DIAGNOSIS — Z87.820 PERSONAL HISTORY OF TRAUMATIC BRAIN INJURY: ICD-10-CM

## 2025-07-01 DIAGNOSIS — F10.129 ALCOHOL ABUSE WITH INTOXICATION, UNSPECIFIED: ICD-10-CM

## 2025-07-01 DIAGNOSIS — R41.82 ALTERED MENTAL STATUS, UNSPECIFIED: ICD-10-CM

## 2025-07-01 LAB
ALBUMIN SERPL ELPH-MCNC: 4.3 G/DL — SIGNIFICANT CHANGE UP (ref 3.5–5.2)
ALP SERPL-CCNC: 81 U/L — SIGNIFICANT CHANGE UP (ref 30–115)
ALT FLD-CCNC: 11 U/L — SIGNIFICANT CHANGE UP (ref 0–41)
ANION GAP SERPL CALC-SCNC: 10 MMOL/L — SIGNIFICANT CHANGE UP (ref 7–14)
ANION GAP SERPL CALC-SCNC: 13 MMOL/L — SIGNIFICANT CHANGE UP (ref 7–14)
APAP SERPL-MCNC: <5 UG/ML — LOW (ref 10–30)
APPEARANCE UR: CLEAR — SIGNIFICANT CHANGE UP
AST SERPL-CCNC: 22 U/L — SIGNIFICANT CHANGE UP (ref 0–41)
BASE EXCESS BLDV CALC-SCNC: -1.5 MMOL/L — SIGNIFICANT CHANGE UP (ref -2–3)
BASOPHILS # BLD AUTO: 0.04 K/UL — SIGNIFICANT CHANGE UP (ref 0–0.2)
BASOPHILS NFR BLD AUTO: 0.9 % — SIGNIFICANT CHANGE UP (ref 0–2)
BILIRUB SERPL-MCNC: 0.3 MG/DL — SIGNIFICANT CHANGE UP (ref 0.2–1.2)
BILIRUB UR-MCNC: NEGATIVE — SIGNIFICANT CHANGE UP
BUN SERPL-MCNC: 8 MG/DL — LOW (ref 10–20)
BUN SERPL-MCNC: 9 MG/DL — LOW (ref 10–20)
CA-I SERPL-SCNC: 1.07 MMOL/L — LOW (ref 1.15–1.33)
CALCIUM SERPL-MCNC: 8.1 MG/DL — LOW (ref 8.4–10.5)
CALCIUM SERPL-MCNC: 8.5 MG/DL — SIGNIFICANT CHANGE UP (ref 8.4–10.5)
CHLORIDE SERPL-SCNC: 100 MMOL/L — SIGNIFICANT CHANGE UP (ref 98–110)
CHLORIDE SERPL-SCNC: 97 MMOL/L — LOW (ref 98–110)
CK SERPL-CCNC: 68 U/L — SIGNIFICANT CHANGE UP (ref 0–225)
CO2 SERPL-SCNC: 18 MMOL/L — SIGNIFICANT CHANGE UP (ref 17–32)
CO2 SERPL-SCNC: 20 MMOL/L — SIGNIFICANT CHANGE UP (ref 17–32)
COLOR SPEC: YELLOW — SIGNIFICANT CHANGE UP
CREAT SERPL-MCNC: 0.5 MG/DL — LOW (ref 0.7–1.5)
CREAT SERPL-MCNC: 0.5 MG/DL — LOW (ref 0.7–1.5)
DIFF PNL FLD: NEGATIVE — SIGNIFICANT CHANGE UP
EGFR: 98 ML/MIN/1.73M2 — SIGNIFICANT CHANGE UP
EOSINOPHIL # BLD AUTO: 0.03 K/UL — SIGNIFICANT CHANGE UP (ref 0–0.5)
EOSINOPHIL NFR BLD AUTO: 0.7 % — SIGNIFICANT CHANGE UP (ref 0–6)
ETHANOL SERPL-MCNC: 167 MG/DL — HIGH
GAS PNL BLDV: 128 MMOL/L — LOW (ref 136–145)
GAS PNL BLDV: SIGNIFICANT CHANGE UP
GLUCOSE SERPL-MCNC: 70 MG/DL — SIGNIFICANT CHANGE UP (ref 70–99)
GLUCOSE SERPL-MCNC: 78 MG/DL — SIGNIFICANT CHANGE UP (ref 70–99)
GLUCOSE UR QL: NEGATIVE MG/DL — SIGNIFICANT CHANGE UP
HCO3 BLDV-SCNC: 24 MMOL/L — SIGNIFICANT CHANGE UP (ref 22–29)
HCT VFR BLD CALC: 38.2 % — SIGNIFICANT CHANGE UP (ref 34.5–45)
HCT VFR BLDA CALC: 41 % — SIGNIFICANT CHANGE UP (ref 34.5–46.5)
HGB BLD CALC-MCNC: 13.5 G/DL — SIGNIFICANT CHANGE UP (ref 11.7–16.1)
HGB BLD-MCNC: 12.7 G/DL — SIGNIFICANT CHANGE UP (ref 11.5–15.5)
IMM GRANULOCYTES # BLD AUTO: 0.01 K/UL — SIGNIFICANT CHANGE UP (ref 0–0.07)
IMM GRANULOCYTES NFR BLD AUTO: 0.2 % — SIGNIFICANT CHANGE UP (ref 0–0.9)
KETONES UR QL: NEGATIVE MG/DL — SIGNIFICANT CHANGE UP
LACTATE BLDV-MCNC: 2 MMOL/L — SIGNIFICANT CHANGE UP (ref 0.5–2)
LEUKOCYTE ESTERASE UR-ACNC: NEGATIVE — SIGNIFICANT CHANGE UP
LYMPHOCYTES # BLD AUTO: 1.76 K/UL — SIGNIFICANT CHANGE UP (ref 1–3.3)
LYMPHOCYTES NFR BLD AUTO: 38.2 % — SIGNIFICANT CHANGE UP (ref 13–44)
MAGNESIUM SERPL-MCNC: 1.9 MG/DL — SIGNIFICANT CHANGE UP (ref 1.8–2.4)
MCHC RBC-ENTMCNC: 28.4 PG — SIGNIFICANT CHANGE UP (ref 27–34)
MCHC RBC-ENTMCNC: 33.2 G/DL — SIGNIFICANT CHANGE UP (ref 32–36)
MCV RBC AUTO: 85.5 FL — SIGNIFICANT CHANGE UP (ref 80–100)
MONOCYTES # BLD AUTO: 0.28 K/UL — SIGNIFICANT CHANGE UP (ref 0–0.9)
MONOCYTES NFR BLD AUTO: 6.1 % — SIGNIFICANT CHANGE UP (ref 2–14)
NEUTROPHILS # BLD AUTO: 2.49 K/UL — SIGNIFICANT CHANGE UP (ref 1.8–7.4)
NEUTROPHILS NFR BLD AUTO: 53.9 % — SIGNIFICANT CHANGE UP (ref 43–77)
NITRITE UR-MCNC: NEGATIVE — SIGNIFICANT CHANGE UP
NRBC # BLD AUTO: 0 K/UL — SIGNIFICANT CHANGE UP (ref 0–0)
NRBC # FLD: 0 K/UL — SIGNIFICANT CHANGE UP (ref 0–0)
NRBC BLD AUTO-RTO: 0 /100 WBCS — SIGNIFICANT CHANGE UP (ref 0–0)
PCO2 BLDV: 44 MMHG — HIGH (ref 39–42)
PH BLDV: 7.35 — SIGNIFICANT CHANGE UP (ref 7.32–7.43)
PH UR: 6 — SIGNIFICANT CHANGE UP (ref 5–8)
PLATELET # BLD AUTO: 207 K/UL — SIGNIFICANT CHANGE UP (ref 150–400)
PMV BLD: 8.6 FL — SIGNIFICANT CHANGE UP (ref 7–13)
PO2 BLDV: 40 MMHG — SIGNIFICANT CHANGE UP (ref 25–45)
POTASSIUM BLDV-SCNC: 4 MMOL/L — SIGNIFICANT CHANGE UP (ref 3.5–5.1)
POTASSIUM SERPL-MCNC: 4.2 MMOL/L — SIGNIFICANT CHANGE UP (ref 3.5–5)
POTASSIUM SERPL-MCNC: 4.5 MMOL/L — SIGNIFICANT CHANGE UP (ref 3.5–5)
POTASSIUM SERPL-SCNC: 4.2 MMOL/L — SIGNIFICANT CHANGE UP (ref 3.5–5)
POTASSIUM SERPL-SCNC: 4.5 MMOL/L — SIGNIFICANT CHANGE UP (ref 3.5–5)
PROT SERPL-MCNC: 6.1 G/DL — SIGNIFICANT CHANGE UP (ref 6–8)
PROT UR-MCNC: NEGATIVE MG/DL — SIGNIFICANT CHANGE UP
RBC # BLD: 4.47 M/UL — SIGNIFICANT CHANGE UP (ref 3.8–5.2)
RBC # FLD: 14 % — SIGNIFICANT CHANGE UP (ref 10.3–14.5)
SALICYLATES SERPL-MCNC: <0.3 MG/DL — LOW (ref 4–30)
SAO2 % BLDV: 66.9 % — LOW (ref 67–88)
SODIUM SERPL-SCNC: 127 MMOL/L — LOW (ref 135–146)
SODIUM SERPL-SCNC: 131 MMOL/L — LOW (ref 135–146)
SP GR SPEC: 1.01 — SIGNIFICANT CHANGE UP (ref 1–1.03)
TROPONIN T, HIGH SENSITIVITY RESULT: 7 NG/L — SIGNIFICANT CHANGE UP (ref 6–13)
UROBILINOGEN FLD QL: 0.2 MG/DL — SIGNIFICANT CHANGE UP (ref 0.2–1)
WBC # BLD: 4.61 K/UL — SIGNIFICANT CHANGE UP (ref 3.8–10.5)
WBC # FLD AUTO: 4.61 K/UL — SIGNIFICANT CHANGE UP (ref 3.8–10.5)

## 2025-07-01 PROCEDURE — 80053 COMPREHEN METABOLIC PANEL: CPT

## 2025-07-01 PROCEDURE — 81003 URINALYSIS AUTO W/O SCOPE: CPT

## 2025-07-01 PROCEDURE — 72170 X-RAY EXAM OF PELVIS: CPT | Mod: 26

## 2025-07-01 PROCEDURE — 72170 X-RAY EXAM OF PELVIS: CPT

## 2025-07-01 PROCEDURE — 82803 BLOOD GASES ANY COMBINATION: CPT

## 2025-07-01 PROCEDURE — 82550 ASSAY OF CK (CPK): CPT

## 2025-07-01 PROCEDURE — 80346 BENZODIAZEPINES1-12: CPT

## 2025-07-01 PROCEDURE — 71045 X-RAY EXAM CHEST 1 VIEW: CPT

## 2025-07-01 PROCEDURE — 80354 DRUG SCREENING FENTANYL: CPT

## 2025-07-01 PROCEDURE — 82330 ASSAY OF CALCIUM: CPT

## 2025-07-01 PROCEDURE — 71045 X-RAY EXAM CHEST 1 VIEW: CPT | Mod: 26

## 2025-07-01 PROCEDURE — 36415 COLL VENOUS BLD VENIPUNCTURE: CPT

## 2025-07-01 PROCEDURE — 85018 HEMOGLOBIN: CPT

## 2025-07-01 PROCEDURE — 80307 DRUG TEST PRSMV CHEM ANLYZR: CPT

## 2025-07-01 PROCEDURE — 84295 ASSAY OF SERUM SODIUM: CPT

## 2025-07-01 PROCEDURE — 85014 HEMATOCRIT: CPT

## 2025-07-01 PROCEDURE — 70450 CT HEAD/BRAIN W/O DYE: CPT | Mod: 26

## 2025-07-01 PROCEDURE — 84484 ASSAY OF TROPONIN QUANT: CPT

## 2025-07-01 PROCEDURE — 70450 CT HEAD/BRAIN W/O DYE: CPT

## 2025-07-01 PROCEDURE — 83605 ASSAY OF LACTIC ACID: CPT

## 2025-07-01 PROCEDURE — 93005 ELECTROCARDIOGRAM TRACING: CPT

## 2025-07-01 PROCEDURE — 99285 EMERGENCY DEPT VISIT HI MDM: CPT | Mod: 25

## 2025-07-01 PROCEDURE — 85025 COMPLETE CBC W/AUTO DIFF WBC: CPT

## 2025-07-01 PROCEDURE — 93010 ELECTROCARDIOGRAM REPORT: CPT

## 2025-07-01 PROCEDURE — 99284 EMERGENCY DEPT VISIT MOD MDM: CPT

## 2025-07-01 PROCEDURE — 83735 ASSAY OF MAGNESIUM: CPT

## 2025-07-01 PROCEDURE — 84132 ASSAY OF SERUM POTASSIUM: CPT

## 2025-07-01 PROCEDURE — 80048 BASIC METABOLIC PNL TOTAL CA: CPT

## 2025-07-01 RX ADMIN — Medication 2000 MILLILITER(S): at 19:45

## 2025-07-01 SDOH — ECONOMIC STABILITY - INCOME SECURITY: UNEMPLOYMENT, UNSPECIFIED: Z56.0

## 2025-07-01 NOTE — ED ADULT NURSE NOTE - NSFALLRISKINTERV_ED_ALL_ED
Assistance OOB with selected safe patient handling equipment if applicable/Assistance with ambulation/Communicate fall risk and risk factors to all staff, patient, and family/Monitor gait and stability/Monitor for mental status changes and reorient to person, place, and time, as needed/Provide visual cue: yellow wristband, yellow gown, etc/Reinforce activity limits and safety measures with patient and family/Toileting schedule using arm’s reach rule for commode and bathroom/Use of alarms - bed, stretcher, chair and/or video monitoring/Call bell, personal items and telephone in reach/Instruct patient to call for assistance before getting out of bed/chair/stretcher/Non-slip footwear applied when patient is off stretcher/[place] to call system/Physically safe environment - no spills, clutter or unnecessary equipment/Purposeful Proactive Rounding/Room/bathroom lighting operational, light cord in reach

## 2025-07-01 NOTE — ED PROVIDER NOTE - NSFOLLOWUPINSTRUCTIONS_ED_ALL_ED_FT
Follow up with your primary care doctor in 1-2 days     Alcohol Use Disorder    WHAT YOU NEED TO KNOW:    Alcohol use disorder (AUD) is problem drinking. AUD includes alcohol abuse and alcohol dependence. Alcohol can damage your brain, heart, kidneys, lungs, and liver. Your risk of stroke is greater if you have 5 or more drinks each day. If you are pregnant, you and your baby are at risk for serious health problems. No amount of alcohol is safe during pregnancy.    DISCHARGE INSTRUCTIONS:    Call your local emergency number (911 in the ) if:     You have seizures.        Call your doctor if:     Your heart is beating faster than usual.      You have hallucinations.      You cannot remember what happens while you are drinking.      You are anxious and have nausea.      Your hands are shaky and you are sweating heavily.      You have questions or concerns about your condition or care.    Follow up with your healthcare provider as directed: Do not try to stop drinking on your own. Your healthcare provider may need to help you withdraw from alcohol safely. He or she may need to admit you to the hospital. You may also need any of the following:    Medicines to decrease your craving for alcohol      Support groups such as Alcoholics Anonymous       Therapy from a psychiatrist or psychologist       Admission to an inpatient facility for treatment for severe AUD    For support and more information:     Substance Abuse and Mental Health Services Administration  [address]  [place] [address]  Web Address: http://www.samhsa.gov      Alcoholics Anonymous  Web Address: http://www.aa.org           © Copyright [employer] 2019 All illustrations and images included in CareNotes are the copyrighted property of [employer]. or [employer].        Hyponatremia    WHAT YOU NEED TO KNOW:    Hyponatremia occurs when the amount of sodium (salt) in your blood is lower than normal. Sodium is an electrolyte (mineral) that helps your muscles, heart, and digestive system work properly. It helps control blood pressure and fluid balance.     DISCHARGE INSTRUCTIONS:    Follow up with your healthcare provider as directed: You may need to return for more tests. Write down your questions so you remember to ask them during your visits.    Nutrition: You may need to increase your intake of sodium. Foods that are high in sodium include milk, packaged snacks such as pretzels, or processed meats (lima, sausage, and ham). Ask your dietitian to help you create a meal plan that is right for you.    Liquids: Follow your healthcare provider's advice if you need to limit the amount of liquid you drink. Ask how much liquid to drink each day and which liquids are best for you. You may be asked to drink liquids that have water, sugar, and salt, such as juices, milk, or sports drinks. These liquids help your body hold in fluid and prevent dehydration.     Contact your healthcare provider if:     You have muscle cramps or twitching.      You feel very weak or tired.      You have nausea or are vomiting.      You have questions or concerns about your condition or care.    Return to the emergency department if:     You have a seizure.      You have an irregular heartbeat.      You have trouble breathing.      You cannot move your arms and legs.      You are confused or cannot think clearly.         © Copyright [employer] 2020

## 2025-07-01 NOTE — ED PROVIDER NOTE - NSFOLLOWUPCLINICSTOKEN_GEN_ALL_ED_FT
142215: || ||00\01||False;[id number]: || ||00\01||False;[id number]: || ||00\01||False;[id number]: || ||00\01||False;

## 2025-07-01 NOTE — ED PROVIDER NOTE - NSFOLLOWUPCLINICS_GEN_ALL_ED_FT
[facility]  [facility]ox  [address] [place] [address]  Phone: [phone]  Fax: [phone]    [facility]  Detox  [address] [place] [address]  Phone: [phone]  Fax: [phone]    [facility]  [facility]ox [facility]mt  [address] [place] [address]  Phone: [phone]  Fax:     [facility]  [facility]  [address] [place] [address]  Phone: [phone]  Fax:

## 2025-07-01 NOTE — ED PROVIDER NOTE - WR ORDER DATE AND TIME 1
Mom states patient has a "constant cough" not showing any further symptoms. Mom says it started like 2 days ago. 01-Jul-2025 19:27

## 2025-07-01 NOTE — ED PROVIDER NOTE - PATIENT PORTAL LINK FT
You can access the FollowMyHealth Patient Portal offered by [facility] by registering at the following website: [email or web address]. By joining [employer]’s FollowMyHealth portal, you will also be able to view your health information using other applications (apps) compatible with our system.

## 2025-07-01 NOTE — ED PROVIDER NOTE - OBJECTIVE STATEMENT
75-year-old female past medical history of anxiety, depression, 2 prior traumatic brain bleeds, hyponatremia comes to emergency room for AMS. AS per sister at bedside patient has been over taking all her psych meds and ran out over a week ago. patient has been supplementing by drinking wine. patient today not answering the phone and when she does just hangs up on sister. when sister went to home found patient on the ground and no AC on in the house and it was >90 degrees she stated. patient also had empty bottles of wine. patient in emergency room states she has no SI/HI and she tripped and fell and landed on ground. no loss of consciousness. patient states felt weak and couldn't get up. patient denies taking any of her psych meds today as she ran out

## 2025-07-01 NOTE — ED PROVIDER NOTE - CLINICAL SUMMARY MEDICAL DECISION MAKING FREE TEXT BOX
74yF w/ PMHx of anxiety and depression  march 2025  had fall with acute on chornic left SDH, acute right SAH/ Intraparenchymal hematoma, declined MMA embolization  and  right hip frx (nonoperative),  seen again May 2025 for  vomiting  HypoNa 124  improved with  ,  On psych meds : lexapro, Mirtazepine 7.5 , 2 tabs once a day;   Seroquel 100mg  at bedtime, xanax 0.5 bedtime; keppra 500mg  -  BIBEMS  after sister noticed she wasnt acting herself-  patient didn't call  sister  today  -    when sister  called .  she said she was good  but she didn't sound right per sister - kept closing and locking the door. sister  went home and got her keys - opened door  found patient on her knees  trying to get up ,  no air conditioning in house.   Per sister  patient ran out of her  psych meds a few days ago as patient was taking  more than prescribed  to help her sleep - pt denies  SI HI , Pt  drinking alcohol  due to not having any more meds.   in ED  did not know  year -  eoth  160 -  as  ETOH cleared -  MS returned,  pt oriented x 3 .  Na 127  received 1 L NS,  BMP repeated,  CT head unchanged,  psych consulted -  cleare dby psych to follow Up with her outpatient psychiatrist.  Sister agrees and feels comfortable taking patient home.  Patient to be discharged from ED in well appearing condition. Any available test results were discussed with and printed  for patient.  Verbal instructions given, including instructions to return to ED immediately for any new, worsening, or concerning symptoms. Limitations of ED work up discussed.  Patient reports understanding of above with capacity and insight. Written discharge instructions additionally given, including follow-up plan.

## 2025-07-01 NOTE — ED ADULT TRIAGE NOTE - CHIEF COMPLAINT QUOTE
pt BIBA from home for AMS, pt  following med bottles empty and has been drinking alcohol with meds as per family, ( Lexapro, Mirtazapine, Xanax, Abiraterone, Seroguel. )

## 2025-07-02 VITALS — SYSTOLIC BLOOD PRESSURE: 116 MMHG | HEART RATE: 72 BPM | DIASTOLIC BLOOD PRESSURE: 67 MMHG

## 2025-07-02 DIAGNOSIS — F10.129 ALCOHOL ABUSE WITH INTOXICATION, UNSPECIFIED: ICD-10-CM

## 2025-07-02 PROBLEM — I61.9 NONTRAUMATIC INTRACEREBRAL HEMORRHAGE, UNSPECIFIED: Chronic | Status: ACTIVE | Noted: 2025-05-25

## 2025-07-02 LAB
AMPHET UR-MCNC: NEGATIVE — SIGNIFICANT CHANGE UP
BARBITURATES UR SCN-MCNC: NEGATIVE — SIGNIFICANT CHANGE UP
BENZODIAZ UR-MCNC: POSITIVE
COCAINE METAB.OTHER UR-MCNC: NEGATIVE — SIGNIFICANT CHANGE UP
DRUG SCREEN 1, URINE RESULT: SIGNIFICANT CHANGE UP
FENTANYL UR QL: NEGATIVE — SIGNIFICANT CHANGE UP
METHADONE UR-MCNC: NEGATIVE — SIGNIFICANT CHANGE UP
OPIATES UR-MCNC: NEGATIVE — SIGNIFICANT CHANGE UP
OXYCODONE UR-MCNC: NEGATIVE — SIGNIFICANT CHANGE UP
PCP UR-MCNC: NEGATIVE — SIGNIFICANT CHANGE UP
PROPOXYPHENE QUALITATIVE URINE RESULT: NEGATIVE — SIGNIFICANT CHANGE UP
THC UR QL: NEGATIVE — SIGNIFICANT CHANGE UP

## 2025-07-02 PROCEDURE — 90792 PSYCH DIAG EVAL W/MED SRVCS: CPT | Mod: 2W

## 2025-07-02 NOTE — ED BEHAVIORAL HEALTH ASSESSMENT NOTE - HPI (INCLUDE ILLNESS QUALITY, SEVERITY, DURATION, TIMING, CONTEXT, MODIFYING FACTORS, ASSOCIATED SIGNS AND SYMPTOMS)
Pt is a 76 yo F, ( passed away 10+ years ago), domiciled alone, unemployed, PMH significant for prior traumatic brain bleeds and a recent prior medical admission for hyponatremia, with PPHx depression, no prior admissions, no pSA/NSSIB, no aggression, +alcohol use(reports drinking 2 glasses of wine 2d/wk, denies hx of complicated withdrawal), denies hx of legal issues, followed by a psychiatrist, Dr Goodman, through [facility], on meds, but has not been adherent for a few days, who presents to the ED BIBA a/b sister for AMS in the setting of intoxication( on arrival) and was also found to be hyponatremic(Na 127).     Writer interviewed pt several hours after arrival when her BAL was estimated to be below 100 mg/dL.     On assessment, the pt is A&O x4, presents as calm, linear, euthymic, and future-oriented, with no e/o internal preoccupation or agitation, and states she drank 2 glass of wine yesterday, after which she received a call from her sister who didn't think she sounded like herself. The pt states her sister subsequently came over and activated EMS, but the pt states she now feels back to her baseline. The pt states she's been drinking more regularly over the last few weeks(up to 2d/wk) in the setting of feeling more stressed about her medical issues after she was hospitalized in May. In     BT[facility] obtained collateral from pt's sister - see  note for details.     Writer also spoke with pt's sister to update her the pt will be psych cleared for discharge. Sister verbalized understanding and agreed she did not think the pt needed inpatient hospitalization at time and feels safe with coming to pick her up. Pt is a 76 yo F, ( passed away 10+ years ago), domiciled alone, unemployed, PMH significant for prior traumatic brain bleeds and a recent prior medical admission for hyponatremia, with PPHx depression, no prior admissions, no pSA/NSSIB, no aggression, +alcohol use(reports drinking 2 glasses of wine 2d/wk, denies hx of complicated withdrawal), denies hx of legal issues, followed by a psychiatrist, Dr Goodman, through [facility], on meds, but has not been adherent for a few days, who presents to the ED BIBA a/b sister for AMS in the setting of intoxication( on arrival) and was also found to be hyponatremic(Na 127).     Writer interviewed pt several hours after arrival when her BAL was estimated to be below 100 mg/dL.     On assessment, the pt is A&O x4, presents as calm, linear, euthymic, and future-oriented, with no e/o internal preoccupation or agitation, and states she drank 2 glass of wine yesterday, after which she received a call from her sister who didn't think she sounded like herself. The pt states her sister subsequently came over, activated EMS, and she was taken to the hospital. The pt acknowledges she wasn't acting like herself earlier after consuming alcohol, but now feels back to her baseline. She states she rarely drank prior to this year, but has been drinking more often(up to 2d/wk) in the setting of feeling stressed about her medical issues after her recent hospitalizations(Mar, May). The pt states, however, she has not been feeling significantly depressed, has not noticed any significant changes in her appetite/sleep/energy, and has continued to enjoy spending time with family, watching baseline, and taking classes at the [facility]. She confirms running out of her medications this week, conveys regret for not having requested a refill from her psychiatrist, but confirms she will reach out to him to send more to her pharmacy. The pt otherwise denies recent SI, cites her family as her main reason to live, and otherwise denies recent HI, A/VH, aggression, PI, or SIB. The pt states she is not interested in either inpt rehab or psychiatric hospitalization, reports she feels safe to return home, and is amenable to receiving additional outpt referrals for psychiatric services.     BTCM obtained collateral from pt's sister - see  note for details.     Writer also spoke with pt's sister to update her the pt will be psych cleared for discharge. Sister verbalized understanding and agreed she did not think the pt needed inpatient hospitalization at time and feels safe with coming to pick her up.

## 2025-07-02 NOTE — ED BEHAVIORAL HEALTH NOTE - BEHAVIORAL HEALTH NOTE
Collateral below has requested that the information provided remain confidential: Yes [  ] No [ x ]  Collateral below has provided information that patient is/may be unaware of: Yes [  ] No [ x ]  Patient gives permission to obtain collateral from _____:  (  ) Yes  (  )  No  Rationale for overriding objection            (  ) Lack of capacity. Details: ________            ( x ) Assessing risk of danger to self/others.  Rationale for selecting specific collateral source            (  ) Potential to impact risk of danger to self/others and no alternative equivalent.    NAME: Kasey Camara  NUMBER: [phone]  RELATIONSHIP: Sister  RELIABILITY: Reliable  COMMENTS: Sister understands the benefit of Pt connecting to community-based mental health treatment in order for Pt to return to baseline  ========================  PATIENT DEMOGRAPHICS:  ========================  HPI  BASELINE FUNCTIONING: Pt is a 75-year old female that lives alone. Sister describes Pt as being unhappy at baseline.  DATE HPI STARTED: 7/1/25  DECOMPENSATION: Sister said that she called Pt since Pt didn't her at her normal time of 9:30am.  Pt sounded confused over the phone and was repeating herself.  Sister went to Pt's house and found Pt to be on the floor in a 90 degree house with no air conditioning; Pt told her that she did not feel hot. Sister said that Pt has been confused and rambling. Sister was concerned at these behaviors and got family to call EMS in order to get Pt evaluated.  SUICIDALITY: Sister denies  VIOLENCE: Sister denies  SUBSTANCE: Sister said that Pt has been drinking wine lately since she is out of medication that helps her sleep  ========================  PAST PSYCHIATRIC HISTORY  ========================  DATE PAST PSYCHIATRIC HISTORY STARTED: May 2025  MAIN PSYCHIATRIC DIAGNOSIS: Depression  PSYCHIATRIC HOSPITALIZATIONS:  PRIOR ILLNESS: Sister said that Pt has been depressed since her  passed away 11 years ago  SUICIDALITY: Sister denies  VIOLENCE: Sister denies  SUBSTANCE USE: Alcohol  ==============  OTHER HISTORY  ==============  CURRENT MEDICATION: Xanax .5mg 2x daily, Lexapro 20mg, Remeron, Seroquel, Metrazapine (dosages unknown)  MEDICAL HISTORY: Low sodium  ALLERGIES:  Counselor did not explore  LEGAL ISSUES:  Counselor did not explore  FIREARM ACCESS:  Counselor did not explore  SOCIAL HISTORY:  Counselor did not explore  FAMILY HISTORY:  Counselor did not explore  DEVELOPMENTAL HISTORY:      Counselor did not explore

## 2025-07-02 NOTE — ED BEHAVIORAL HEALTH ASSESSMENT NOTE - SUMMARY
Pt is a 74 yo F, ( passed away 10+ years ago), domiciled alone, unemployed, PMH significant for prior traumatic brain bleeds and a recent prior medical admission for hyponatremia, with PPHx depression, no prior admissions, no pSA/NSSIB, no aggression, +alcohol use(reports drinking 2 glasses of wine 2d/wk, denies hx of complicated withdrawal), denies hx of legal issues, followed by a psychiatrist, Dr Goodman, through [facility], on meds, but has not been adherent for a few days, who presents to the ED BIBA a/b sister for AMS in the setting of intoxication( on arrival) and was also found to be hyponatremic(Na 127).    The pt's presentation is c/f delirium in the setting of acute intoxication and hyponatremia, that's since resolved; r/o SIMD vs MDD. The pt presents as a chronic risk of harm given her hx of personal loss, prior psych hx, age, alcohol misuse, recent med non-adherence, and distress tied to her medical issues. However, her current risk is mitigated by the fact she presents as future-oriented, is able to safety plan, denies SI/HI, has support from her sister, has no pSA or NSSIB, has no hx of aggression, and currently does not report or manifest symptoms of acute depression, keyshawn, or psychosis. In light of these mitigating/protective factors, the pt does not present as an imminent risk of harm to self or others and she does not meet criteria for involuntary psychiatric hospitalization. Although she declined voluntary admission, her risk was further mitigated in the ED by allowing her to metabolize, engaging her in safety planning, emphasizing the importance of reaching out to her provider to refill her medications, providing her additional referrals for therapy/rehab services, and instructing her to return to the ED for acute safety concerns. In addition, the pt's sister was instructed to return pt to the ED for acute safety concerns in the future, which she agreed to do. No psychiatric contraindication to discharge.

## 2025-07-08 ENCOUNTER — APPOINTMENT (OUTPATIENT)
Dept: ORTHOPEDIC SURGERY | Facility: CLINIC | Age: 75
End: 2025-07-08